# Patient Record
Sex: FEMALE | Race: BLACK OR AFRICAN AMERICAN | NOT HISPANIC OR LATINO | Employment: FULL TIME | URBAN - METROPOLITAN AREA
[De-identification: names, ages, dates, MRNs, and addresses within clinical notes are randomized per-mention and may not be internally consistent; named-entity substitution may affect disease eponyms.]

---

## 2017-09-27 DIAGNOSIS — E03.9 HYPOTHYROIDISM, UNSPECIFIED TYPE: ICD-10-CM

## 2017-09-27 NOTE — TELEPHONE ENCOUNTER
levothyroxine (LEVOTHROID) 50 MCG tablet     Last Written Prescription Date: 9/9/16  Last Quantity: 90, # refills: 3  Last Office Visit with G, [facility] or [facility] prescribing provider: 8/22/16        TSH   Date Value Ref Range Status   08/22/2016 2.78 0.40 - 4.00 mU/L Final

## 2017-09-28 RX ORDER — LEVOTHYROXINE SODIUM 50 UG/1
TABLET ORAL
Qty: 90 TABLET | Refills: 0 | Status: SHIPPED | OUTPATIENT
Start: 2017-09-28 | End: 2018-01-02

## 2017-09-28 NOTE — TELEPHONE ENCOUNTER
Medication is being filled for 1 time refill only due to:  Patient needs labs tsh. Patient needs to be seen because it has been more than one year since last visit.

## 2017-12-22 ENCOUNTER — OFFICE VISIT (OUTPATIENT)
Dept: FAMILY MEDICINE | Facility: CLINIC | Age: 47
End: 2017-12-22
Payer: COMMERCIAL

## 2017-12-22 VITALS
SYSTOLIC BLOOD PRESSURE: 100 MMHG | HEIGHT: 65 IN | HEART RATE: 68 BPM | DIASTOLIC BLOOD PRESSURE: 62 MMHG | TEMPERATURE: 96.6 F | WEIGHT: 161 LBS | BODY MASS INDEX: 26.82 KG/M2

## 2017-12-22 DIAGNOSIS — E55.9 VITAMIN D DEFICIENCY: ICD-10-CM

## 2017-12-22 DIAGNOSIS — D50.9 IRON DEFICIENCY ANEMIA, UNSPECIFIED IRON DEFICIENCY ANEMIA TYPE: ICD-10-CM

## 2017-12-22 DIAGNOSIS — Z00.00 ROUTINE GENERAL MEDICAL EXAMINATION AT A HEALTH CARE FACILITY: Primary | ICD-10-CM

## 2017-12-22 DIAGNOSIS — E03.9 HYPOTHYROIDISM, UNSPECIFIED TYPE: ICD-10-CM

## 2017-12-22 DIAGNOSIS — R22.31 LOCALIZED SWELLING, MASS, OR LUMP OF UPPER EXTREMITY, RIGHT: ICD-10-CM

## 2017-12-22 LAB
CHOLEST SERPL-MCNC: 244 MG/DL
ERYTHROCYTE [DISTWIDTH] IN BLOOD BY AUTOMATED COUNT: 12.4 % (ref 10–15)
GLUCOSE SERPL-MCNC: 94 MG/DL (ref 70–99)
HCT VFR BLD AUTO: 42.9 % (ref 35–47)
HDLC SERPL-MCNC: 67 MG/DL
HGB BLD-MCNC: 14.5 G/DL (ref 11.7–15.7)
LDLC SERPL CALC-MCNC: 159 MG/DL
MCH RBC QN AUTO: 30 PG (ref 26.5–33)
MCHC RBC AUTO-ENTMCNC: 33.8 G/DL (ref 31.5–36.5)
MCV RBC AUTO: 89 FL (ref 78–100)
NONHDLC SERPL-MCNC: 177 MG/DL
PLATELET # BLD AUTO: 199 10E9/L (ref 150–450)
RBC # BLD AUTO: 4.84 10E12/L (ref 3.8–5.2)
TRIGL SERPL-MCNC: 88 MG/DL
TSH SERPL DL<=0.005 MIU/L-ACNC: 1.84 MU/L (ref 0.4–4)
WBC # BLD AUTO: 3.1 10E9/L (ref 4–11)

## 2017-12-22 PROCEDURE — 82947 ASSAY GLUCOSE BLOOD QUANT: CPT | Performed by: FAMILY MEDICINE

## 2017-12-22 PROCEDURE — 82306 VITAMIN D 25 HYDROXY: CPT | Performed by: FAMILY MEDICINE

## 2017-12-22 PROCEDURE — 85027 COMPLETE CBC AUTOMATED: CPT | Performed by: FAMILY MEDICINE

## 2017-12-22 PROCEDURE — 36415 COLL VENOUS BLD VENIPUNCTURE: CPT | Performed by: FAMILY MEDICINE

## 2017-12-22 PROCEDURE — 80061 LIPID PANEL: CPT | Performed by: FAMILY MEDICINE

## 2017-12-22 PROCEDURE — 99396 PREV VISIT EST AGE 40-64: CPT | Performed by: FAMILY MEDICINE

## 2017-12-22 PROCEDURE — 84443 ASSAY THYROID STIM HORMONE: CPT | Performed by: FAMILY MEDICINE

## 2017-12-22 NOTE — PROGRESS NOTES
SUBJECTIVE:   CC: Ruth Castellon is an 47 year old woman who presents for preventive health visit.     Physical   Annual:     Getting at least 3 servings of Calcium per day::  Yes    Bi-annual eye exam::  Yes    Dental care twice a year::  Yes    Sleep apnea or symptoms of sleep apnea::  None    Diet::  Regular (no restrictions)    Frequency of exercise::  2-3 days/week    Duration of exercise::  15-30 minutes    Taking medications regularly::  Yes    Medication side effects::  None    Additional concerns today::  No          she has lost referral for general surgery that she was given for the swelling on her right arm. Requesting referral again.     Today's PHQ-2 Score: PHQ-2 ( 1999 Pfizer) 12/22/2017   Q1: Little interest or pleasure in doing things 0   Q2: Feeling down, depressed or hopeless 0   PHQ-2 Score 0   Q1: Little interest or pleasure in doing things Not at all   Q2: Feeling down, depressed or hopeless Not at all   PHQ-2 Score 0       Abuse: Current or Past(Physical, Sexual or Emotional)- no  Do you feel safe in your environment - Yes    Social History   Substance Use Topics     Smoking status: Never Smoker     Smokeless tobacco: Never Used      Comment: Nonsmoking household     Alcohol use No     Alcohol Use 12/22/2017   If you drink alcohol, do you typically have greater than 3 drinks per day OR greater than 7 drinks per week?   Not applicable   No flowsheet data found.      Reviewed orders with patient.  Reviewed health maintenance and updated orders accordingly - Yes  Labs reviewed in EPIC  BP Readings from Last 3 Encounters:   12/22/17 100/62   08/22/16 90/57   06/17/15 (!) 89/53    Wt Readings from Last 3 Encounters:   12/22/17 161 lb (73 kg)   08/22/16 156 lb (70.8 kg)   06/17/15 155 lb (70.3 kg)                  Patient Active Problem List   Diagnosis     Dysmenorrhea     Vitamin D deficiency     Enlarged thyroid gland     Gastritis, chronic     Pelvic pain in female     [facility]     Perineal  insufficiency     CARDIOVASCULAR SCREENING; LDL GOAL LESS THAN 160     History of [id number]     Health Care Home     Hypothyroidism, unspecified type     Iron deficiency anemia, unspecified iron deficiency anemia type     Past Surgical History:   Procedure Laterality Date      SECTION STANDBY      [facility] REMOVAL OF OVARY/TUBE(S)      left sided. postpartum ? cyst ? ovarian vein thrombosis       Social History   Substance Use Topics     Smoking status: Never Smoker     Smokeless tobacco: Never Used      Comment: Nonsmoking household     Alcohol use No     Family History   Problem Relation Age of Onset     Glaucoma No family hx of      Macular Degeneration No family hx of          Current Outpatient Prescriptions   Medication Sig Dispense Refill     levothyroxine (SYNTHROID/LEVOTHROID) 50 MCG tablet TAKE ONE TABLET BY MOUTH EVERY DAY 90 tablet 0     No Known Allergies  Recent Labs   Lab Test  17   0914  16   1857   02/20/15   1356   14   0844  13   1207   11   2117   LDL  159*   --    --   128   --    --   109   --    --    HDL  67   --    --   74   --    --   53   --    --    TRIG  88   --    --   64   --    --   44   --    --    ALT   --   38   --    --    --    --   39   --   16   CR   --   0.74   --    --    --   0.77  0.65   --   0.80   GFRESTIMATED   --   85   --    --    --   83  >90   --   80   GFRESTBLACK   --   >90   GFR Calc     --    --    --   >90  >90   --   >90   POTASSIUM   --   3.9   --    --    --   4.1  3.7   --    --    TSH  1.84  2.78   < >   --    < >  4.79   --    < >   --     < > = values in this interval not displayed.              Patient under age 50, mutual decision reflected in health maintenance.        Pertinent mammograms are reviewed under the imaging tab.  History of abnormal Pap smear: NO - age 30-65 PAP every 5 years with negative HPV co-testing recommended    Reviewed and updated as needed this visit  "by clinical staff         Reviewed and updated as needed this visit by Provider        Review of Systems  C: NEGATIVE for fever, chills, change in weight  I: NEGATIVE for worrisome rashes, moles or lesions  E: NEGATIVE for vision changes or irritation  ENT: NEGATIVE for ear, mouth and throat problems  R: NEGATIVE for significant cough or SOB  B: NEGATIVE for masses, tenderness or discharge  CV: NEGATIVE for chest pain, palpitations or peripheral edema  GI: NEGATIVE for nausea, abdominal pain, heartburn, or change in bowel habits  : NEGATIVE for unusual urinary or vaginal symptoms. Periods are regular.  M: NEGATIVE for significant arthralgias or myalgia  N: NEGATIVE for weakness, dizziness or paresthesias  P: NEGATIVE for changes in mood or affect     OBJECTIVE:   /62  Pulse 68  Temp 96.6  F (35.9  C) (Oral)  Ht 5' 5\" (1.651 m)  Wt 161 lb (73 kg)  BMI 26.79 kg/m2  Physical Exam  GENERAL: healthy, alert and no distress  EYES: Eyes grossly normal to inspection, PERRL and conjunctivae and sclerae normal  HENT: ear canals and TM's normal, nose and mouth without ulcers or lesions  NECK: no adenopathy, no asymmetry, masses, or scars and thyroid normal to palpation  RESP: lungs clear to auscultation - no rales, rhonchi or wheezes  BREAST: normal without masses, tenderness or nipple discharge and no palpable axillary masses or adenopathy  CV: regular rate and rhythm, normal S1 S2, no S3 or S4, no murmur, click or rub, no peripheral edema and peripheral pulses strong  ABDOMEN: soft, nontender, no hepatosplenomegaly, no masses and bowel sounds normal  MS: no gross musculoskeletal defects noted, no edema  SKIN: no suspicious lesions or rashes  NEURO: Normal strength and tone, mentation intact and speech normal  PSYCH: mentation appears normal, affect normal/bright    ASSESSMENT/PLAN:       ICD-10-CM    1. Routine general medical examination at a health care facility Z00.00 Lipid panel reflex to direct LDL Fasting " "    Glucose   2. Hypothyroidism, unspecified type E03.9 TSH WITH FREE T4 REFLEX   3. Vitamin D deficiency E55.9 Vitamin D Deficiency   4. Iron deficiency anemia, unspecified iron deficiency anemia type D50.9 CBC with platelets   5. Localized swelling, mass, or lump of upper extremity, right R22.31 GENERAL SURG ADULT REFERRAL     Ordered labs as above.     Per pt she was told by Endocrinology to f/u with PCP as long as her labs look good on medications.     COUNSELING:  Reviewed preventive health counseling, as reflected in patient instructions     reports that she has never smoked. She has never used smokeless tobacco.    Estimated body mass index is 25.96 kg/(m^2) as calculated from the following:    Height as of 8/22/16: 5' 5\" (1.651 m).    Weight as of 8/22/16: 156 lb (70.8 kg).       Counseling Resources:  ATP IV Guidelines  Pooled Cohorts Equation Calculator  Breast Cancer Risk Calculator  FRAX Risk Assessment  ICSI Preventive Guidelines  Dietary Guidelines for Americans, 2010  USDA's MyPlate  ASA Prophylaxis  Lung CA Screening    Darlene Tolliver MD  [facility]  Answers for HPI/ROS submitted by the patient on 12/22/2017   PHQ-2 Score: 0    "

## 2017-12-22 NOTE — LETTER
[facility]   [address] [place]  [address]  [phone]                               January 2, 2018    Ruth Castellon  [address] [facility] [address]        Dear Ruth,    Your recent labs are attached. Continue taking levothyroxine, refill has been sent to you pharmacy.     Take 2000 IU of vitamin D daily.     Results for orders placed or performed in visit on 12/22/17   TSH WITH FREE T4 REFLEX   Result Value Ref Range    TSH 1.84 0.40 - 4.00 mU/L   Vitamin D Deficiency   Result Value Ref Range    Vitamin D Deficiency screening 20 20 - 75 ug/L   Lipid panel reflex to direct LDL Fasting   Result Value Ref Range    Cholesterol 244 (H) <200 mg/dL    Triglycerides 88 <150 mg/dL    HDL Cholesterol 67 >49 mg/dL    LDL Cholesterol Calculated 159 (H) <100 mg/dL    Non HDL Cholesterol 177 (H) <130 mg/dL   Glucose   Result Value Ref Range    Glucose 94 70 - 99 mg/dL   CBC with platelets   Result Value Ref Range    WBC 3.1 (L) 4.0 - 11.0 10e9/L    RBC Count 4.84 3.8 - 5.2 10e12/L    Hemoglobin 14.5 11.7 - 15.7 g/dL    Hematocrit 42.9 35.0 - 47.0 %    MCV 89 78 - 100 fl    MCH 30.0 26.5 - 33.0 pg    MCHC 33.8 31.5 - 36.5 g/dL    RDW 12.4 10.0 - 15.0 %    Platelet Count 199 150 - 450 10e9/L   If you have any questions please call the clinic at [phone]    Sincerely,    Darlene Tolliver MD  bmd

## 2017-12-22 NOTE — NURSING NOTE
"Chief Complaint   Patient presents with     Physical       Initial /62  Pulse 68  Temp 96.6  F (35.9  C) (Oral)  Ht 5' 5\" (1.651 m)  Wt 161 lb (73 kg)  BMI 26.79 kg/m2 Estimated body mass index is 26.79 kg/(m^2) as calculated from the following:    Height as of this encounter: 5' 5\" (1.651 m).    Weight as of this encounter: 161 lb (73 kg).  Medication Reconciliation: complete  Carson Amezquita MA    "

## 2017-12-22 NOTE — MR AVS SNAPSHOT
After Visit Summary   12/22/2017    Ruth Castellon    MRN: [id number]           Patient Information     Date Of Birth          1970        Visit Information        Provider Department      12/22/2017 8:20 AM Darlene Tolliver MD [facility]        Today's Diagnoses     Routine general medical examination at a health care facility    -  1    Hypothyroidism, unspecified type        Vitamin D deficiency        Iron deficiency anemia, unspecified iron deficiency anemia type        Localized swelling, mass, or lump of upper extremity, right          Care Instructions      Preventive Health Recommendations  Female Ages 40 to 49    Yearly exam:     See your health care provider every year in order to  1. Review health changes.   2. Discuss preventive care.    3. Review your medicines if your doctor prescribed any.      Get a Pap test every three years (unless you have an abnormal result and your provider advises testing more often).      If you get Pap tests with HPV test, you only need to test every 5 years, unless you have an abnormal result. You do not need a Pap test if your uterus was removed (hysterectomy) and you have not had cancer.      You should be tested each year for STDs (sexually transmitted diseases), if you're at risk.       Ask your doctor if you should have a mammogram.      Have a colonoscopy (test for colon cancer) if someone in your family has had colon cancer or polyps before age 50.       Have a cholesterol test every 5 years.       Have a diabetes test (fasting glucose) after age 45. If you are at risk for diabetes, you should have this test every 3 years.    Shots: Get a flu shot each year. Get a tetanus shot every 10 years.     Nutrition:     Eat at least 5 servings of fruits and vegetables each day.    Eat whole-grain bread, whole-wheat pasta and brown rice instead of white grains and rice.    Talk to your provider about Calcium and Vitamin D.      Lifestyle    Exercise at least 150 minutes a week (an average of 30 minutes a day, 5 days a week). This will help you control your weight and prevent disease.    Limit alcohol to one drink per day.    No smoking.     Wear sunscreen to prevent skin cancer.    See your dentist every six months for an exam and cleaning.          Follow-ups after your visit        Additional Services     GENERAL SURG ADULT REFERRAL       Your provider has referred you to: [facility]: [facility] [phone]   [email or web address]    Please be aware that coverage of these services is subject to the terms and limitations of your health insurance plan.  Call member services at your health plan with any benefit or coverage questions.      Please bring the following with you to your appointment:    (1) Any X-Rays, CTs or MRIs which have been performed.  Contact the facility where they were done to arrange for  prior to your scheduled appointment.   (2) List of current medications   (3) This referral request   (4) Any documents/labs given to you for this referral                  Who to contact     If you have questions or need follow up information about today's clinic visit or your schedule please contact [facility] directly at [phone].  Normal or non-critical lab and imaging results will be communicated to you by MyChart, letter or phone within 4 business days after the clinic has received the results. If you do not hear from us within 7 days, please contact the clinic through [employer] or phone. If you have a critical or abnormal lab result, we will notify you by phone as soon as possible.  Submit refill requests through [employer] or call your pharmacy and they will forward the refill request to us. Please allow 3 business days for your refill to be completed.          Additional Information About Your Visit        [employer] Information     [employer] lets you send  "messages to your doctor, view your test results, renew your prescriptions, schedule appointments and more. To sign up, go to [email or web address] . Click on \"Log in\" on the left side of the screen, which will take you to the Welcome page. Then click on \"Sign up Now\" on the right side of the page.     You will be asked to enter the access code listed below, as well as some personal information. Please follow the directions to create your username and password.     Your access code is: [email or web address]  Expires: 3/22/2018  9:12 AM     Your access code will  in 90 days. If you need help or a new code, please call your [facility] clinic or [phone].        Care EveryWhere ID     This is your Care EveryWhere ID. This could be used by other organizations to access your [facility] medical records  [id number]        Your Vitals Were     Pulse Temperature Height BMI (Body Mass Index)          68 96.6  F (35.9  C) (Oral) 5' 5\" (1.651 m) 26.79 kg/m2         Blood Pressure from Last 3 Encounters:   17 100/62   16 90/57   06/17/15 (!) 89/53    Weight from Last 3 Encounters:   17 161 lb (73 kg)   16 156 lb (70.8 kg)   06/17/15 155 lb (70.3 kg)              We Performed the Following     CBC with platelets     GENERAL SURG ADULT REFERRAL     Glucose     Lipid panel reflex to direct LDL Fasting     TSH WITH FREE T4 REFLEX     Vitamin D Deficiency        Primary Care Provider Office Phone # Fax #    Darlene Zachery Tolliver [phone]       [address] [facility] [address]        Equal Access to Services     [facility]: Kayley Ghosh, maura villeda, allison valerio. So [facility] [phone].    ATENCIÓN: Si habla español, tiene a sellers disposición servicios gratuitos de asistencia lingüística. Llame al [phone].    We comply with applicable federal civil rights laws and [place] laws. We do not " discriminate on the basis of race, color, national origin, age, disability, sex, sexual orientation, or gender identity.            Thank you!     Thank you for choosing [facility]  for your care. Our goal is always to provide you with excellent care. Hearing back from our patients is one way we can continue to improve our services. Please take a few minutes to complete the written survey that you may receive in the mail after your visit with us. Thank you!             Your Updated Medication List - Protect others around you: Learn how to safely use, store and throw away your medicines at www.disposemymeds.org.          This list is accurate as of: 12/22/17  9:12 AM.  Always use your most recent med list.                   Brand Name Dispense Instructions for use Diagnosis    levothyroxine 50 MCG tablet    SYNTHROID/LEVOTHROID    90 tablet    TAKE ONE TABLET BY MOUTH EVERY DAY    Hypothyroidism, unspecified type

## 2017-12-23 LAB — DEPRECATED CALCIDIOL+CALCIFEROL SERPL-MC: 20 UG/L (ref 20–75)

## 2018-01-02 DIAGNOSIS — E03.9 HYPOTHYROIDISM, UNSPECIFIED TYPE: Primary | ICD-10-CM

## 2018-01-02 DIAGNOSIS — E55.9 VITAMIN D DEFICIENCY: ICD-10-CM

## 2018-01-02 RX ORDER — CHOLECALCIFEROL (VITAMIN D3) 50 MCG
2000 TABLET ORAL DAILY
Qty: 100 TABLET | Refills: 3 | Status: SHIPPED | OUTPATIENT
Start: 2018-01-02 | End: 2018-12-24

## 2018-01-02 RX ORDER — LEVOTHYROXINE SODIUM 50 UG/1
50 TABLET ORAL DAILY
Qty: 90 TABLET | Refills: 3 | Status: SHIPPED | OUTPATIENT
Start: 2018-01-02 | End: 2019-01-31

## 2018-01-02 NOTE — PROGRESS NOTES
Dear Ruth Castellon,     Your recent labs are attached. Continue taking levothyroxine, refill has been sent to you pharmacy.     Take 2000 IU of vitamin D daily.     Darlene Tolliver MD.   Family Physician.  [facility].

## 2018-12-24 ENCOUNTER — OFFICE VISIT (OUTPATIENT)
Dept: FAMILY MEDICINE | Facility: CLINIC | Age: 48
End: 2018-12-24
Payer: COMMERCIAL

## 2018-12-24 VITALS
TEMPERATURE: 97.6 F | HEIGHT: 65 IN | WEIGHT: 155 LBS | BODY MASS INDEX: 25.83 KG/M2 | DIASTOLIC BLOOD PRESSURE: 70 MMHG | SYSTOLIC BLOOD PRESSURE: 111 MMHG | HEART RATE: 60 BPM

## 2018-12-24 DIAGNOSIS — D72.819 LEUKOPENIA, UNSPECIFIED TYPE: ICD-10-CM

## 2018-12-24 DIAGNOSIS — Z00.00 ROUTINE GENERAL MEDICAL EXAMINATION AT A HEALTH CARE FACILITY: Primary | ICD-10-CM

## 2018-12-24 DIAGNOSIS — E55.9 VITAMIN D DEFICIENCY: ICD-10-CM

## 2018-12-24 DIAGNOSIS — E03.9 HYPOTHYROIDISM, UNSPECIFIED TYPE: ICD-10-CM

## 2018-12-24 DIAGNOSIS — R22.31 MASS OF RIGHT UPPER EXTREMITY: ICD-10-CM

## 2018-12-24 DIAGNOSIS — D50.9 IRON DEFICIENCY ANEMIA, UNSPECIFIED IRON DEFICIENCY ANEMIA TYPE: ICD-10-CM

## 2018-12-24 LAB
ERYTHROCYTE [DISTWIDTH] IN BLOOD BY AUTOMATED COUNT: 12.2 % (ref 10–15)
FERRITIN SERPL-MCNC: 31 NG/ML (ref 8–252)
HCT VFR BLD AUTO: 39.9 % (ref 35–47)
HGB BLD-MCNC: 13.5 G/DL (ref 11.7–15.7)
IRON SATN MFR SERPL: 41 % (ref 15–46)
IRON SERPL-MCNC: 112 UG/DL (ref 35–180)
MCH RBC QN AUTO: 30.1 PG (ref 26.5–33)
MCHC RBC AUTO-ENTMCNC: 33.8 G/DL (ref 31.5–36.5)
MCV RBC AUTO: 89 FL (ref 78–100)
PLATELET # BLD AUTO: 186 10E9/L (ref 150–450)
RBC # BLD AUTO: 4.48 10E12/L (ref 3.8–5.2)
TIBC SERPL-MCNC: 274 UG/DL (ref 240–430)
TSH SERPL DL<=0.005 MIU/L-ACNC: 1.98 MU/L (ref 0.4–4)
WBC # BLD AUTO: 2.6 10E9/L (ref 4–11)

## 2018-12-24 PROCEDURE — 84443 ASSAY THYROID STIM HORMONE: CPT | Performed by: PHYSICIAN ASSISTANT

## 2018-12-24 PROCEDURE — 36415 COLL VENOUS BLD VENIPUNCTURE: CPT | Performed by: PHYSICIAN ASSISTANT

## 2018-12-24 PROCEDURE — 83540 ASSAY OF IRON: CPT | Performed by: PHYSICIAN ASSISTANT

## 2018-12-24 PROCEDURE — 82306 VITAMIN D 25 HYDROXY: CPT | Performed by: PHYSICIAN ASSISTANT

## 2018-12-24 PROCEDURE — 83550 IRON BINDING TEST: CPT | Performed by: PHYSICIAN ASSISTANT

## 2018-12-24 PROCEDURE — 99396 PREV VISIT EST AGE 40-64: CPT | Performed by: PHYSICIAN ASSISTANT

## 2018-12-24 PROCEDURE — 82728 ASSAY OF FERRITIN: CPT | Performed by: PHYSICIAN ASSISTANT

## 2018-12-24 PROCEDURE — 85027 COMPLETE CBC AUTOMATED: CPT | Performed by: PHYSICIAN ASSISTANT

## 2018-12-24 RX ORDER — LEVOTHYROXINE SODIUM 50 UG/1
50 TABLET ORAL DAILY
Qty: 90 TABLET | Refills: 3 | Status: CANCELLED | OUTPATIENT
Start: 2018-12-24

## 2018-12-24 RX ORDER — CHOLECALCIFEROL (VITAMIN D3) 50 MCG
2000 TABLET ORAL DAILY
Qty: 100 TABLET | Refills: 3 | Status: SHIPPED | OUTPATIENT
Start: 2018-12-24 | End: 2020-03-11

## 2018-12-24 ASSESSMENT — ENCOUNTER SYMPTOMS
NERVOUS/ANXIOUS: 0
COUGH: 0
HEADACHES: 0
HEMATOCHEZIA: 0
HEMATURIA: 0
BREAST MASS: 0
SORE THROAT: 0
CONSTIPATION: 0
DIZZINESS: 0
CHILLS: 0
WEAKNESS: 0
FREQUENCY: 0
MYALGIAS: 0
ABDOMINAL PAIN: 0
JOINT SWELLING: 0
NAUSEA: 0
HEARTBURN: 0
DIARRHEA: 0
FEVER: 0
SHORTNESS OF BREATH: 0
DYSURIA: 0
PALPITATIONS: 0
EYE PAIN: 0
ARTHRALGIAS: 1
PARESTHESIAS: 0

## 2018-12-24 ASSESSMENT — MIFFLIN-ST. JEOR: SCORE: 1343.96

## 2018-12-24 NOTE — PROGRESS NOTES
img   SUBJECTIVE:   CC: Ruth Castellon is an 48 year old woman who presents for preventive health visit.     Physical   Annual:     Getting at least 3 servings of Calcium per day:  Yes    Bi-annual eye exam:  Yes    Dental care twice a year:  Yes    Sleep apnea or symptoms of sleep apnea:  None    Diet:  Regular (no restrictions)    Frequency of exercise:  2-3 days/week    Duration of exercise:  Less than 15 minutes    Taking medications regularly:  Yes    Medication side effects:  None    Additional concerns today:  No    PHQ-2 Total Score: 0        Swelling above inside of right elbow - wants to discuss surgery  Has had the lump on right and now becoming painful.  Never had imaging.   Started after an IV years ago.      Wants to see OB about suturing up her vaginal after her last baby.        Today's PHQ-2 Score:   PHQ-2 ( 1999 Pfizer) 12/24/2018   Q1: Little interest or pleasure in doing things 0   Q2: Feeling down, depressed or hopeless 0   PHQ-2 Score 0   Q1: Little interest or pleasure in doing things Not at all   Q2: Feeling down, depressed or hopeless Not at all   PHQ-2 Score 0       Abuse: Current or Past(Physical, Sexual or Emotional)- No  Do you feel safe in your environment? Yes    Social History     Tobacco Use     Smoking status: Never Smoker     Smokeless tobacco: Never Used     Tobacco comment: Nonsmoking household   Substance Use Topics     Alcohol use: No     Alcohol Use 12/24/2018   If you drink alcohol do you typically have greater than 3 drinks per day OR greater than 7 drinks per week? No   No flowsheet data found.    Reviewed orders with patient.  Reviewed health maintenance and updated orders accordingly - Yes  Labs reviewed in [facility]    Mammogram Screening: Patient under age 50, mutual decision reflected in health maintenance.      Pertinent mammograms are reviewed under the imaging tab.  History of abnormal Pap smear: NO - age 30-65 PAP every 5 years with negative HPV co-testing recommended  PAP  "/ HPV 2/20/2015 1/31/2012 12/14/2009   PAP NIL NIL NIL     Reviewed and updated as needed this visit by clinical staff  Tobacco  Allergies  Meds  Med Hx  Surg Hx  Fam Hx  Soc Hx        Reviewed and updated as needed this visit by Provider  Allergies  Meds            Review of Systems   Constitutional: Negative for chills and fever.   HENT: Negative for congestion, ear pain, hearing loss and sore throat.    Eyes: Negative for pain and visual disturbance.   Respiratory: Negative for cough and shortness of breath.    Cardiovascular: Negative for chest pain, palpitations and peripheral edema.   Gastrointestinal: Negative for abdominal pain, constipation, diarrhea, heartburn, hematochezia and nausea.   Breasts:  Negative for tenderness, breast mass and discharge.   Genitourinary: Negative for dysuria, frequency, genital sores, hematuria, pelvic pain, urgency, vaginal bleeding and vaginal discharge.   Musculoskeletal: Positive for arthralgias (right arm ). Negative for joint swelling and myalgias.   Skin: Negative for rash.   Neurological: Negative for dizziness, weakness, headaches and paresthesias.   Psychiatric/Behavioral: Negative for mood changes. The patient is not nervous/anxious.           OBJECTIVE:   /70 (Cuff Size: Adult Regular)   Pulse 60   Temp 97.6  F (36.4  C) (Oral)   Ht 1.651 m (5' 5\")   Wt 70.3 kg (155 lb)   BMI 25.79 kg/m    Physical Exam   Constitutional: She is oriented to person, place, and time. She appears well-developed and well-nourished. No distress.   HENT:   Right Ear: Tympanic membrane and external ear normal.   Left Ear: Tympanic membrane and external ear normal.   Nose: Nose normal.   Mouth/Throat: Oropharynx is clear and moist. No oropharyngeal exudate.   Eyes: Conjunctivae are normal. Pupils are equal, round, and reactive to light. Right eye exhibits no discharge. Left eye exhibits no discharge.   Neck: Neck supple. No tracheal deviation present. No thyromegaly " present.   Cardiovascular: Normal rate, regular rhythm, S1 normal, S2 normal, normal heart sounds and normal pulses. Exam reveals no S3, no S4 and no friction rub.   No murmur heard.  Pulmonary/Chest: Effort normal and breath sounds normal. No respiratory distress. She has no wheezes. She has no rales. Right breast exhibits no mass, no nipple discharge and no tenderness. Left breast exhibits no mass, no nipple discharge and no tenderness.   Abdominal: Soft. Bowel sounds are normal. She exhibits no mass. There is no hepatosplenomegaly. There is no tenderness.   Musculoskeletal: Normal range of motion. She exhibits no edema.        Arms:  Lymphadenopathy:     She has no cervical adenopathy.   Neurological: She is alert and oriented to person, place, and time. She has normal strength and normal reflexes. She exhibits normal muscle tone.   Skin: Skin is warm and dry. No rash noted.   Psychiatric: She has a normal mood and affect. Judgment and thought content normal. Cognition and memory are normal.         Diagnostic Test Results:  none     ASSESSMENT/PLAN:   1. Routine general medical examination at a health care facility  Follow up when labs are back.  Sounds like pt wants her episiotomy  repaired, tried to explain it has healed by now but pt insistent to see gyn    - CBC with platelets  - Iron and iron binding capacity  - Ferritin  - OB/GYN REFERRAL    2. Vitamin D deficiency  Follow up as needed  - vitamin D3 (CHOLECALCIFEROL) 2000 units tablet; Take 1 tablet by mouth daily  Dispense: 100 tablet; Refill: 3  - Vitamin D Deficiency    3. Hypothyroidism, unspecified type  As above.  Will refill medications once labs are back.    - TSH WITH FREE T4 REFLEX    4. Iron deficiency anemia, unspecified iron deficiency anemia type  As above  - Iron and iron binding capacity  - Ferritin    5. Leukopenia, unspecified type  As above  - CBC with platelets    6. Mass of right upper extremity  Likely lipoma.  Follow up as needed  -  "US Extremity Non Vascular Right; Future    COUNSELING:      BP Readings from Last 1 Encounters:   12/24/18 111/70     Estimated body mass index is 25.79 kg/m  as calculated from the following:    Height as of this encounter: 1.651 m (5' 5\").    Weight as of this encounter: 70.3 kg (155 lb).      Weight management plan: Discussed healthy diet and exercise guidelines     reports that  has never smoked. she has never used smokeless tobacco.      Counseling Resources:  ATP IV Guidelines  Pooled Cohorts Equation Calculator  Breast Cancer Risk Calculator  FRAX Risk Assessment  ICSI Preventive Guidelines  Dietary Guidelines for Americans, 2010  USDA's MyPlate  ASA Prophylaxis  Lung CA Screening    Joleen Macdonald PA-C  [facility]  "

## 2018-12-26 LAB — DEPRECATED CALCIDIOL+CALCIFEROL SERPL-MC: 22 UG/L (ref 20–75)

## 2018-12-27 ENCOUNTER — ANCILLARY PROCEDURE (OUTPATIENT)
Dept: ULTRASOUND IMAGING | Facility: CLINIC | Age: 48
End: 2018-12-27
Attending: PHYSICIAN ASSISTANT
Payer: COMMERCIAL

## 2018-12-27 DIAGNOSIS — R22.31 MASS OF RIGHT UPPER EXTREMITY: ICD-10-CM

## 2018-12-27 PROCEDURE — 76882 US LMTD JT/FCL EVL NVASC XTR: CPT | Mod: RT

## 2018-12-28 ENCOUNTER — TELEPHONE (OUTPATIENT)
Dept: FAMILY MEDICINE | Facility: CLINIC | Age: 48
End: 2018-12-28

## 2018-12-28 DIAGNOSIS — D17.30 LIPOMA OF SKIN AND SUBCUTANEOUS TISSUE: Primary | ICD-10-CM

## 2018-12-28 DIAGNOSIS — D72.819 LEUKOPENIA, UNSPECIFIED TYPE: Primary | ICD-10-CM

## 2018-12-28 NOTE — TELEPHONE ENCOUNTER
Attempt # 1  Called patient at home number.  Was call answered? Yes, relayed message from PCP to patient - Patient verbalized understanding and agreement with plan and asked for a referral to surgeon also states it has grown again and really wants it off.      Chana Wright RN  [facility]

## 2018-12-28 NOTE — TELEPHONE ENCOUNTER
Attempt # 1  Called patient at home [phone]    Was call answered?  No answer, left message to labs WNL except WBC and will need to call clinic for a lab only visit in next few weeks.    Chana Wright RN  [facility]

## 2018-12-28 NOTE — TELEPHONE ENCOUNTER
Please let pt know that her labs are normal except her wbc continues to be low.  I would like her to return for more blood work in the next few weeks.      Joleen Macdonald PA-C

## 2018-12-28 NOTE — TELEPHONE ENCOUNTER
Attempt # 1  Called patient at home [phone] mail box full. Tried [phone] No answer, left message to call nurse line at [phone]    Was call answered?    Chana Wright RN  [facility]

## 2018-12-28 NOTE — TELEPHONE ENCOUNTER
Referral information:   Note    Your provider has referred you to: [facility]: [facility] [phone]   [email or web address]             Attempt # 1  Called patient at home [phone]  Was call answered? Yes, relayed above information - patient request a call back and leave number on voice mail as patient is driving at this time.   Nurse called back and left referral information on voice mail.     Chana Wright RN  [facility]

## 2018-12-28 NOTE — TELEPHONE ENCOUNTER
Patient returned call to RN line and left message for call back to her at [phone].  Khushboo Higginbotham RN  [facility]

## 2018-12-28 NOTE — TELEPHONE ENCOUNTER
Please let pt know her ultrasound does show that that mass is a lipoma.  If she wants it removed I will put in a referral to the surgeon.

## 2018-12-31 NOTE — TELEPHONE ENCOUNTER
Attempt # 1  Called patient at home [phone]     Was call answered?  yes, relayed message from PCP - Patient verbalized understanding and agreement with plan and did schedule lab only appointment.      Chana Wright RN  [facility]

## 2019-01-10 DIAGNOSIS — D72.819 LEUKOPENIA, UNSPECIFIED TYPE: ICD-10-CM

## 2019-01-10 LAB
RETICS # AUTO: 50.3 10E9/L (ref 25–95)
RETICS/RBC NFR AUTO: 1.1 % (ref 0.5–2)

## 2019-01-10 PROCEDURE — 85025 COMPLETE CBC W/AUTO DIFF WBC: CPT | Performed by: PHYSICIAN ASSISTANT

## 2019-01-10 PROCEDURE — 85045 AUTOMATED RETICULOCYTE COUNT: CPT | Performed by: PHYSICIAN ASSISTANT

## 2019-01-10 PROCEDURE — 36415 COLL VENOUS BLD VENIPUNCTURE: CPT | Performed by: PHYSICIAN ASSISTANT

## 2019-01-10 PROCEDURE — 85060 BLOOD SMEAR INTERPRETATION: CPT | Performed by: PHYSICIAN ASSISTANT

## 2019-01-11 LAB — COPATH REPORT: NORMAL

## 2019-01-14 ENCOUNTER — TELEPHONE (OUTPATIENT)
Dept: SURGERY | Facility: CLINIC | Age: 49
End: 2019-01-14

## 2019-01-14 ENCOUNTER — OFFICE VISIT (OUTPATIENT)
Dept: SURGERY | Facility: CLINIC | Age: 49
End: 2019-01-14
Payer: COMMERCIAL

## 2019-01-14 VITALS
DIASTOLIC BLOOD PRESSURE: 66 MMHG | HEART RATE: 77 BPM | SYSTOLIC BLOOD PRESSURE: 106 MMHG | BODY MASS INDEX: 26.46 KG/M2 | TEMPERATURE: 97.6 F | WEIGHT: 159 LBS

## 2019-01-14 DIAGNOSIS — D17.30 LIPOMA OF SKIN AND SUBCUTANEOUS TISSUE: ICD-10-CM

## 2019-01-14 PROBLEM — D70.8 OTHER NEUTROPENIA (H): Status: ACTIVE | Noted: 2019-01-14

## 2019-01-14 PROCEDURE — 99243 OFF/OP CNSLTJ NEW/EST LOW 30: CPT | Performed by: SURGERY

## 2019-01-14 NOTE — PROGRESS NOTES
Joleen Macdonald  [address] [facility] [address]    EDMUNDO CHAVIS    Patient seen in consultation for lipoma by Joleen Macdonald      I had the pleasure of seeing Edmundo Chavis in my office on 2019 for evaluation.    CC:   Chief Complaint   Patient presents with     Consult     Right Arm Lipoma     Today diagnosis (D17.30) Lipoma of skin and subcutaneous tissue      HPI: 48-year-old -American female who was referred to clinic for evaluation of right upper extremity mass.  Patient's was seen by her PCP who ordered an ultrasound of the area which showed a 5.6 ovoid lesion consistent with a lipoma.  Patient states that the mass has been present for several years and has slowly enlarged in size.  At this point in time because of the size patient is reporting increased pressure and discomfort over the mass that is worsened by movement or bending the arm.  Nothing seems to make it better.  She has never had this before.  No other masses or lesions of concern are reported today.    PAST MEDICAL HISTORY:  Past Medical History:   Diagnosis Date     Enlarged thyroid gland     normal function       PAST SURGICAL HISTORY:  Past Surgical History:   Procedure Laterality Date      SECTION STANDBY      [facility] REMOVAL OF OVARY/TUBE(S)      left sided. postpartum ? cyst ? ovarian vein thrombosis       MEDICATIONS:    Current Outpatient Medications:      levothyroxine (SYNTHROID/LEVOTHROID) 50 MCG tablet, Take 1 tablet (50 mcg) by mouth daily, Disp: 90 tablet, Rfl: 3     vitamin D3 (CHOLECALCIFEROL) 2000 units tablet, Take 1 tablet by mouth daily, Disp: 100 tablet, Rfl: 3    ALLERGIES:  No Known Allergies    SOCIAL HISTORY:  Social History     Socioeconomic History     Marital status:      Spouse name: Not on file     Number of children: Not on file     Years of education: Not on file     Highest education level: Not on file   Social Needs     Financial  resource strain: Not on file     Food insecurity - worry: Not on file     Food insecurity - inability: Not on file     Transportation needs - medical: Not on file     Transportation needs - non-medical: Not on file   Occupational History     Not on file   Tobacco Use     Smoking status: Never Smoker     Smokeless tobacco: Never Used     Tobacco comment: Nonsmoking household   Substance and Sexual Activity     Alcohol use: No     Drug use: No     Sexual activity: Yes     Partners: Male   Other Topics Concern     Parent/sibling w/ CABG, MI or angioplasty before 65F 55M? No   Social History Narrative     Not on file       FAMILY HISTORY:   Family History   Problem Relation Age of Onset     Glaucoma No family hx of      Macular Degeneration No family hx of      No significant family history of cardiovascular disease otherwise.    REVIEW OF SYSTEMS:  CONSTITUTIONAL:NEGATIVE for fever, chills, change in weight  INTEGUMENTARY/SKIN: NEGATIVE for worrisome rashes, moles or lesions  EYES: NEGATIVE for vision changes or irritation  ENT/MOUTH: NEGATIVE for ear, mouth and throat problems  RESP:NEGATIVE for significant cough or SOB  BREAST: NEGATIVE for masses, tenderness or discharge  CV: NEGATIVE for chest pain, palpitations or peripheral edema  GI: NEGATIVE for nausea, abdominal pain, heartburn, or change in bowel habits  negative  MUSCULOSKELETAL: NEGATIVE for significant arthralgias or myalgia  NEURO: NEGATIVE for weakness, dizziness or paresthesias  ENDOCRINE: NEGATIVE for temperature intolerance, skin/hair changes  HEME/ALLERGY/IMMUNE: NEGATIVE for bleeding problems  PSYCHIATRIC: NEGATIVE for changes in mood or affect        PHYSICAL EXAMINATION: Limited exam secondary to patient preference to keep [employer] garb on.   Vitals: /66   Pulse 77   Temp 97.6  F (36.4  C) (Oral)   Wt 72.1 kg (159 lb)   BMI 26.46 kg/m    BMI= Body mass index is 26.46 kg/m .  GENERAL/PSYCH: Patient is awake, A&Ox3, NAD, stable mood, good  judgement and insight.  HEAD: Atraumatic, Normocephalic  EYES: Anicteric. Pupils equal and reactive  RUE: Soft, rubbery, mobile palpable lesion at the AC fossa.  Non-tender.       LABS:    Orders Only on 01/10/2019   Component Date Value     % Retic 01/10/2019 1.1      Absolute Retic 01/10/2019 50.3      WBC 01/10/2019 3.0*     RBC Count 01/10/2019 4.67      Hemoglobin 01/10/2019 14.0      Hematocrit 01/10/2019 41.2      MCV 01/10/2019 88      MCH 01/10/2019 30.0      MCHC 01/10/2019 34.0      RDW 01/10/2019 12.2      Platelet Count 01/10/2019 186      Diff Method 01/10/2019 PENDING      Copath Report 01/10/2019                      Value:Patient Name: EDMUNDO CHAVIS  MR#: [id number]  Specimen #: [id number]  Collected: 1/10/2019  Received: 1/10/2019  Reported: 1/11/2019 18:10  Ordering Phy(s): ANDREW KHOURY    For improved result formatting, select 'View Enhanced Report Format' under   Linked Documents section.    TEST(S):  Peripheral Smear Morphology    FINAL DIAGNOSIS:  Peripheral blood morphology:  - Mild leukopenia with mild absolute neutropenia.    COMMENT:  Leukopenia with neutropenia in an adult may be seen in association with   drug reactions, certain viral  infections, vitamin deficiencies, primary hematologic disorder, autoimmune   processes, hypersplenism and copper  deficiency, among other causes.    Electronically signed out by:    Dereje Suggs M.D.    CLINICAL HISTORY:  48 year old female.  Leukopenia with neutropenia.    PERIPHERAL BLOOD DATA:  NOTE:  The automated total and differential blood cell counts provided   below and under Clinical Lab Results  correlate with microscopic examination of the                           peripheral blood smear.    PERIPHERAL BLOOD DATA (Date: 01/10/2019)  Patient Value (Reference Range >18 year old female)  2.98    WBC        (4.0-11.0 x 10*9/L)  4.67    RBC         (3.8-5.2 x 10*12/L)  14.0    HGB         (11.7-15.7 g/dL)  41.2    HCT         (35.0-47.0  %)  88.2    MCV        (78-100fL)  30.0    MCH        (26.5-33.0 pg)  34.0    MCHC      (31.5-36.5 g/dL)  12.2    RDW       (10.0-15.0 %)   186    PLT         (150-450 x 10*9/L)    PERIPHERAL BLOOD DIFFERENTIAL - Automated  (Reference ranges >18 year old)    Percent  42.0%  Neutrophils  44.0%  Lymphocytes  12.4%  Monocytes   1.3%  Eosinophils   0.3%  Basophils    Absolute  1.25   Neutrophils  (Ref normal 1.6 - 8.3 x 10*9/L)  1.31   Lymphocytes  (Ref normal 0.8 - 5.3 x 10*9/L)  0.37   Monocytes  (Ref normal 0 -1.3 x 10*9/L)  0.04   Eosinophils  (Ref normal 0 - 0.7 x 10*9/L)  0.01   Basophils  (Ref normal 0 - 0.2 x 10*9/L)    PERIPHERAL BLOOD MORPHOLOGY:    ERYTHROCYTES:  The red cells are normocytic, normochromic and n                          ormal in   number for the patient's age and  gender.  Anisopoikilocytosis is minimal.  No features of hemolysis or   increased polychromasia are identified.  No intracellular microorganisms are identified.    LEUKOCYTES:  The leukocytes are mildly decreased in number and are normal   in  morphology and differential  distribution, except there is mild absolute neutropenia.  No immature   precursors or evidence of neutrophilic  dysplasia is seen. No atypical lymphoid cells are seen. No intracellular   microorganisms are identified.    PLATELETS:  The platelets are normal in number and morphology.    CLINICAL LAB RESULTS:  Battery Order No. Lab Test Code Clinical Result Ref. Range Units Result   Date  Hemogram/Diff/PLT C34723 CP WBC Count L 3.0 4.0-11.0 10e9/L 1/10/2019   09:01       RBC Count 4.67 3.8-5.2 10e12/L 1/10/2019 09:01       Hemoglobin 14.0 11.7-15.7 g/dL 1/10/2019 09:01       Hematocrit 41.2 35.0-47.0 % 1/10/2019 09:01       MCV 88  fl 1/10/2019 09:01       MCH 30.0 26.5                          -33.0 pg 1/10/2019 09:01       MCHC 34.0 31.5-36.5 g/dL 1/10/2019 09:01       RDW 12.2 10.0-15.0 % 1/10/2019 09:01       Platelet Count 186 150-450 10e9/L 1/10/2019  09:01    Retic  MG Retic % 1.1 0.5-2.0 % 1/10/2019 11:37       Retic abs 50.3 25-95 10e9/L 1/10/2019 11:37    Hemogram/PLT T10370 CP WBC Count L 2.6 4.0-11.0 10e9/L 12/24/2018 09:10       RBC Count 4.48 3.8-5.2 10e12/L 12/24/2018 09:10       Hemoglobin 13.5 11.7-15.7 g/dL 12/24/2018 09:10       Hematocrit 39.9 35.0-47.0 % 12/24/2018 09:10       MCV 89  fl 12/24/2018 09:10       MCH 30.1 26.5-33.0 pg 12/24/2018 09:10       MCHC 33.8 31.5-36.5 g/dL 12/24/2018 09:10       RDW 12.2 10.0-15.0 % 12/24/2018 09:10       Platelet Count 186 150-450 10e9/L 12/24/2018 09:10    Iron Binding Panel  MG Iron 112  ug/dL 12/24/2018 11:56       Iron Binding Cap 274 240-430 ug/dL 12/24/2018 11:56       Iron Sat Index 41 15-46 % 12/24/2018 11:56    Ferritin   Ferritin 31 8-252 ng/mL 12/24/2018 11:56    CPT Codes:  A: 40104-UFMA    T                          [facility] LOCATION:  [address] [address] [phone]    COLLECTION SITE:  Client:  [facility]  Location:  [facility] (B)     Office Visit on 12/24/2018   Component Date Value     TSH 12/24/2018 1.98      Vitamin D Deficiency scr* 12/24/2018 22      WBC 12/24/2018 2.6*     RBC Count 12/24/2018 4.48      Hemoglobin 12/24/2018 13.5      Hematocrit 12/24/2018 39.9      MCV 12/24/2018 89      MCH 12/24/2018 30.1      MCHC 12/24/2018 33.8      RDW 12/24/2018 12.2      Platelet Count 12/24/2018 186      Iron 12/24/2018 112      Iron Binding Cap 12/24/2018 274      Iron Saturation Index 12/24/2018 41      Ferritin 12/24/2018 31        STUDIES:   Upper extremity Ultrasound: A 5.4 cm ovoid subcutaneous lesion appears to correspond to the patient's palpable abnormality and has an appearance suggestive of a subcutaneous lipoma.    IMPRESSION and PLAN:  Lipoma of skin and subcutaneous tissue  RUE, 5 cm lipoma at the AC fossa    Plan for excision    Risks and  benefits discussed in detail.        All questions were answered.

## 2019-01-14 NOTE — LETTER
2019         RE: Edmundo Castellon  [address] [facility] [address]        Dear Colleague,    Thank you for referring your patient, Edmundo Castellon, to the [facility]. Please see a copy of my visit note below.    Joleen Macdonald  [address] [facility] [address]    EDMUNDO CASTELLON    Patient seen in consultation for lipoma by Joleen Macdonald      I had the pleasure of seeing Edmundo Castellon in my office on 2019 for evaluation.    CC:   Chief Complaint   Patient presents with     Consult     Right Arm Lipoma     Today diagnosis (D17.30) Lipoma of skin and subcutaneous tissue      HPI: 48-year-old -American female who was referred to clinic for evaluation of right upper extremity mass.  Patient's was seen by her PCP who ordered an ultrasound of the area which showed a 5.6 ovoid lesion consistent with a lipoma.  Patient states that the mass has been present for several years and has slowly enlarged in size.  At this point in time because of the size patient is reporting increased pressure and discomfort over the mass that is worsened by movement or bending the arm.  Nothing seems to make it better.  She has never had this before.  No other masses or lesions of concern are reported today.    PAST MEDICAL HISTORY:  Past Medical History:   Diagnosis Date     Enlarged thyroid gland     normal function       PAST SURGICAL HISTORY:  Past Surgical History:   Procedure Laterality Date      SECTION STANDBY      [facility] REMOVAL OF OVARY/TUBE(S)      left sided. postpartum ? cyst ? ovarian vein thrombosis       MEDICATIONS:    Current Outpatient Medications:      levothyroxine (SYNTHROID/LEVOTHROID) 50 MCG tablet, Take 1 tablet (50 mcg) by mouth daily, Disp: 90 tablet, Rfl: 3     vitamin D3 (CHOLECALCIFEROL) 2000 units tablet, Take 1 tablet by mouth daily, Disp: 100 tablet, Rfl: 3    ALLERGIES:  No Known Allergies    SOCIAL  HISTORY:  Social History     Socioeconomic History     Marital status:      Spouse name: Not on file     Number of children: Not on file     Years of education: Not on file     Highest education level: Not on file   Social Needs     Financial resource strain: Not on file     Food insecurity - worry: Not on file     Food insecurity - inability: Not on file     Transportation needs - medical: Not on file     Transportation needs - non-medical: Not on file   Occupational History     Not on file   Tobacco Use     Smoking status: Never Smoker     Smokeless tobacco: Never Used     Tobacco comment: Nonsmoking household   Substance and Sexual Activity     Alcohol use: No     Drug use: No     Sexual activity: Yes     Partners: Male   Other Topics Concern     Parent/sibling w/ CABG, MI or angioplasty before 65F 55M? No   Social History Narrative     Not on file       FAMILY HISTORY:   Family History   Problem Relation Age of Onset     Glaucoma No family hx of      Macular Degeneration No family hx of      No significant family history of cardiovascular disease otherwise.    REVIEW OF SYSTEMS:  CONSTITUTIONAL:NEGATIVE for fever, chills, change in weight  INTEGUMENTARY/SKIN: NEGATIVE for worrisome rashes, moles or lesions  EYES: NEGATIVE for vision changes or irritation  ENT/MOUTH: NEGATIVE for ear, mouth and throat problems  RESP:NEGATIVE for significant cough or SOB  BREAST: NEGATIVE for masses, tenderness or discharge  CV: NEGATIVE for chest pain, palpitations or peripheral edema  GI: NEGATIVE for nausea, abdominal pain, heartburn, or change in bowel habits  negative  MUSCULOSKELETAL: NEGATIVE for significant arthralgias or myalgia  NEURO: NEGATIVE for weakness, dizziness or paresthesias  ENDOCRINE: NEGATIVE for temperature intolerance, skin/hair changes  HEME/ALLERGY/IMMUNE: NEGATIVE for bleeding problems  PSYCHIATRIC: NEGATIVE for changes in mood or affect        PHYSICAL EXAMINATION: Limited exam secondary to  patient preference to keep Religion garb on.   Vitals: /66   Pulse 77   Temp 97.6  F (36.4  C) (Oral)   Wt 72.1 kg (159 lb)   BMI 26.46 kg/m     BMI= Body mass index is 26.46 kg/m .  GENERAL/PSYCH: Patient is awake, A&Ox3, NAD, stable mood, good judgement and insight.  HEAD: Atraumatic, Normocephalic  EYES: Anicteric. Pupils equal and reactive  RUE: Soft, rubbery, mobile palpable lesion at the AC fossa.  Non-tender.       LABS:    Orders Only on 01/10/2019   Component Date Value     % Retic 01/10/2019 1.1      Absolute Retic 01/10/2019 50.3      WBC 01/10/2019 3.0*     RBC Count 01/10/2019 4.67      Hemoglobin 01/10/2019 14.0      Hematocrit 01/10/2019 41.2      MCV 01/10/2019 88      MCH 01/10/2019 30.0      MCHC 01/10/2019 34.0      RDW 01/10/2019 12.2      Platelet Count 01/10/2019 186      Diff Method 01/10/2019 PENDING      Copath Report 01/10/2019                      Value:Patient Name: EDMUNDO CHAVIS  MR#: [id number]  Specimen #: [id number]  Collected: 1/10/2019  Received: 1/10/2019  Reported: 1/11/2019 18:10  Ordering Phy(s): ANDREW KHOURY    For improved result formatting, select 'View Enhanced Report Format' under   Linked Documents section.    TEST(S):  Peripheral Smear Morphology    FINAL DIAGNOSIS:  Peripheral blood morphology:  - Mild leukopenia with mild absolute neutropenia.    COMMENT:  Leukopenia with neutropenia in an adult may be seen in association with   drug reactions, certain viral  infections, vitamin deficiencies, primary hematologic disorder, autoimmune   processes, hypersplenism and copper  deficiency, among other causes.    Electronically signed out by:    Dereje Suggs M.D.    CLINICAL HISTORY:  48 year old female.  Leukopenia with neutropenia.    PERIPHERAL BLOOD DATA:  NOTE:  The automated total and differential blood cell counts provided   below and under Clinical Lab Results  correlate with microscopic examination of the                           peripheral blood  smear.    PERIPHERAL BLOOD DATA (Date: 01/10/2019)  Patient Value (Reference Range >18 year old female)  2.98    WBC        (4.0-11.0 x 10*9/L)  4.67    RBC         (3.8-5.2 x 10*12/L)  14.0    HGB         (11.7-15.7 g/dL)  41.2    HCT         (35.0-47.0 %)  88.2    MCV        (78-100fL)  30.0    MCH        (26.5-33.0 pg)  34.0    MCHC      (31.5-36.5 g/dL)  12.2    RDW       (10.0-15.0 %)   186    PLT         (150-450 x 10*9/L)    PERIPHERAL BLOOD DIFFERENTIAL - Automated  (Reference ranges >18 year old)    Percent  42.0%  Neutrophils  44.0%  Lymphocytes  12.4%  Monocytes   1.3%  Eosinophils   0.3%  Basophils    Absolute  1.25   Neutrophils  (Ref normal 1.6 - 8.3 x 10*9/L)  1.31   Lymphocytes  (Ref normal 0.8 - 5.3 x 10*9/L)  0.37   Monocytes  (Ref normal 0 -1.3 x 10*9/L)  0.04   Eosinophils  (Ref normal 0 - 0.7 x 10*9/L)  0.01   Basophils  (Ref normal 0 - 0.2 x 10*9/L)    PERIPHERAL BLOOD MORPHOLOGY:    ERYTHROCYTES:  The red cells are normocytic, normochromic and n                          ormal in   number for the patient's age and  gender.  Anisopoikilocytosis is minimal.  No features of hemolysis or   increased polychromasia are identified.  No intracellular microorganisms are identified.    LEUKOCYTES:  The leukocytes are mildly decreased in number and are normal   in  morphology and differential  distribution, except there is mild absolute neutropenia.  No immature   precursors or evidence of neutrophilic  dysplasia is seen. No atypical lymphoid cells are seen. No intracellular   microorganisms are identified.    PLATELETS:  The platelets are normal in number and morphology.    CLINICAL LAB RESULTS:  Battery Order No. Lab Test Code Clinical Result Ref. Range Units Result   Date  Hemogram/Diff/PLT [id number] CP WBC Count L 3.0 4.0-11.0 10e9/L 1/10/2019   09:01       RBC Count 4.67 3.8-5.2 10e12/L 1/10/2019 09:01       Hemoglobin 14.0 11.7-15.7 g/dL 1/10/2019 09:01       Hematocrit 41.2 35.0-47.0 % 1/10/2019  09:01       MCV 88  fl 1/10/2019 09:01       MCH 30.0 26.5                          -33.0 pg 1/10/2019 09:01       MCHC 34.0 31.5-36.5 g/dL 1/10/2019 09:01       RDW 12.2 10.0-15.0 % 1/10/2019 09:01       Platelet Count 186 150-450 10e9/L 1/10/2019 09:01    Retic  MG Retic % 1.1 0.5-2.0 % 1/10/2019 11:37       Retic abs 50.3 25-95 10e9/L 1/10/2019 11:37    Hemogram/PLT [id number] CP WBC Count L 2.6 4.0-11.0 10e9/L 12/24/2018 09:10       RBC Count 4.48 3.8-5.2 10e12/L 12/24/2018 09:10       Hemoglobin 13.5 11.7-15.7 g/dL 12/24/2018 09:10       Hematocrit 39.9 35.0-47.0 % 12/24/2018 09:10       MCV 89  fl 12/24/2018 09:10       MCH 30.1 26.5-33.0 pg 12/24/2018 09:10       MCHC 33.8 31.5-36.5 g/dL 12/24/2018 09:10       RDW 12.2 10.0-15.0 % 12/24/2018 09:10       Platelet Count 186 150-450 10e9/L 12/24/2018 09:10    Iron Binding Panel  MG Iron 112  ug/dL 12/24/2018 11:56       Iron Binding Cap 274 240-430 ug/dL 12/24/2018 11:56       Iron Sat Index 41 15-46 % 12/24/2018 11:56    Ferritin   Ferritin 31 8-252 ng/mL 12/24/2018 11:56    CPT Codes:  A: 75461-SIUQ    T                          [facility] LOCATION:  [address] [address] [phone]    COLLECTION SITE:  Client:  [facility]  Location:  [facility] (B)     Office Visit on 12/24/2018   Component Date Value     TSH 12/24/2018 1.98      Vitamin D Deficiency scr* 12/24/2018 22      WBC 12/24/2018 2.6*     RBC Count 12/24/2018 4.48      Hemoglobin 12/24/2018 13.5      Hematocrit 12/24/2018 39.9      MCV 12/24/2018 89      MCH 12/24/2018 30.1      MCHC 12/24/2018 33.8      RDW 12/24/2018 12.2      Platelet Count 12/24/2018 186      Iron 12/24/2018 112      Iron Binding Cap 12/24/2018 274      Iron Saturation Index 12/24/2018 41      Ferritin 12/24/2018 31        STUDIES:   Upper extremity Ultrasound: A 5.4 cm ovoid subcutaneous lesion  appears to correspond to the patient's palpable abnormality and has an appearance suggestive of a subcutaneous lipoma.    IMPRESSION and PLAN:  Lipoma of skin and subcutaneous tissue  RUE, 5 cm lipoma at the AC fossa    Plan for excision    Risks and benefits discussed in detail.        All questions were answered.      Again, thank you for allowing me to participate in the care of your patient.        Sincerely,        Odell Guerra, DO

## 2019-01-16 LAB
BASOPHILS # BLD AUTO: 0 10E9/L (ref 0–0.2)
BASOPHILS NFR BLD AUTO: 0 %
DIFFERENTIAL METHOD BLD: ABNORMAL
EOSINOPHIL # BLD AUTO: 0 10E9/L (ref 0–0.7)
EOSINOPHIL NFR BLD AUTO: 1 %
ERYTHROCYTE [DISTWIDTH] IN BLOOD BY AUTOMATED COUNT: 12.2 % (ref 10–15)
HCT VFR BLD AUTO: 41.2 % (ref 35–47)
HGB BLD-MCNC: 14 G/DL (ref 11.7–15.7)
LYMPHOCYTES # BLD AUTO: 1.3 10E9/L (ref 0.8–5.3)
LYMPHOCYTES NFR BLD AUTO: 44 %
MCH RBC QN AUTO: 30 PG (ref 26.5–33)
MCHC RBC AUTO-ENTMCNC: 34 G/DL (ref 31.5–36.5)
MCV RBC AUTO: 88 FL (ref 78–100)
MONOCYTES # BLD AUTO: 0.4 10E9/L (ref 0–1.3)
MONOCYTES NFR BLD AUTO: 13 %
NEUTROPHILS # BLD AUTO: 1.3 10E9/L (ref 1.6–8.3)
NEUTROPHILS NFR BLD AUTO: 42 %
PLATELET # BLD AUTO: 186 10E9/L (ref 150–450)
RBC # BLD AUTO: 4.67 10E12/L (ref 3.8–5.2)
WBC # BLD AUTO: 3 10E9/L (ref 4–11)

## 2019-01-30 DIAGNOSIS — E03.9 HYPOTHYROIDISM, UNSPECIFIED TYPE: ICD-10-CM

## 2019-01-30 NOTE — TELEPHONE ENCOUNTER
"Requested Prescriptions   Pending Prescriptions Disp Refills     levothyroxine (SYNTHROID/LEVOTHROID) 50 MCG tablet 90 tablet 3    Last Written Prescription Date:  1/2/18  Last Fill Quantity: 90,  # refills: 3   Last office visit: 12/24/2018 with prescribing provider:     Future Office Visit:     Sig: Take 1 tablet (50 mcg) by mouth daily    Thyroid Protocol Passed - 1/30/2019  2:55 PM       Passed - Patient is 12 years or older       Passed - Recent (12 mo) or future (30 days) visit within the authorizing provider's specialty    Patient had office visit in the last 12 months or has a visit in the next 30 days with authorizing provider or within the authorizing provider's specialty.  See \"Patient Info\" tab in inbasket, or \"Choose Columns\" in Meds & Orders section of the refill encounter.             Passed - Medication is active on med list       Passed - Normal TSH on file in past 12 months    Recent Labs   Lab Test 12/24/18  0818   TSH 1.98             Passed - No active pregnancy on record    If patient is pregnant or has had a positive pregnancy test, please check TSH.         Passed - No positive pregnancy test in past 12 months    If patient is pregnant or has had a positive pregnancy test, please check TSH.            "

## 2019-01-31 RX ORDER — LEVOTHYROXINE SODIUM 50 UG/1
50 TABLET ORAL DAILY
Qty: 90 TABLET | Refills: 3 | Status: SHIPPED | OUTPATIENT
Start: 2019-01-31 | End: 2020-03-16

## 2019-02-07 ENCOUNTER — TELEPHONE (OUTPATIENT)
Facility: CLINIC | Age: 49
End: 2019-02-07

## 2019-02-07 ENCOUNTER — HOSPITAL ENCOUNTER (OUTPATIENT)
Facility: AMBULATORY SURGERY CENTER | Age: 49
End: 2019-02-07
Attending: SURGERY
Payer: COMMERCIAL

## 2019-02-07 NOTE — TELEPHONE ENCOUNTER
Type of surgery: excision of lipoma RUE  CPT 91722  Lipoma of skin and subcutaneous tissue [D17.30]   Location of surgery: [facility]  Date and time of surgery: 02/15/2019 / catia  Surgeon: CHRISTIAN Conley   Pre-Op Appt Date: 02/11/2019  Post-Op Appt Date: 02/25/2019   Packet sent out: Yes  Pre-cert/Authorization completed:  No pre cert needed  Date: 02/07/2019

## 2019-02-07 NOTE — TELEPHONE ENCOUNTER
Reason for Call:  Other call back    Detailed comments: Patient is scheduled for surgery on 02/15/2019 she has questions regarding anesthesia she is wanting only local and not mac. Please call and advise Thank you .     Phone Number Patient can be reached at: Home number on file [phone] (home)    Best Time: any    Can we leave a detailed message on this number? YES    Call taken on 2/7/2019 at 11:27 AM by Safia Pierce

## 2019-02-11 ENCOUNTER — OFFICE VISIT (OUTPATIENT)
Dept: FAMILY MEDICINE | Facility: CLINIC | Age: 49
End: 2019-02-11
Payer: COMMERCIAL

## 2019-02-11 VITALS
DIASTOLIC BLOOD PRESSURE: 56 MMHG | BODY MASS INDEX: 26.49 KG/M2 | HEIGHT: 65 IN | HEART RATE: 60 BPM | OXYGEN SATURATION: 98 % | SYSTOLIC BLOOD PRESSURE: 106 MMHG | TEMPERATURE: 97.5 F | WEIGHT: 159 LBS

## 2019-02-11 DIAGNOSIS — Z01.818 PREOP GENERAL PHYSICAL EXAM: Primary | ICD-10-CM

## 2019-02-11 DIAGNOSIS — D17.30 LIPOMA OF SKIN AND SUBCUTANEOUS TISSUE: ICD-10-CM

## 2019-02-11 PROCEDURE — 99214 OFFICE O/P EST MOD 30 MIN: CPT | Performed by: PHYSICIAN ASSISTANT

## 2019-02-11 ASSESSMENT — MIFFLIN-ST. JEOR: SCORE: 1362.1

## 2019-02-11 NOTE — PATIENT INSTRUCTIONS
Surgery Feb 15   Before Your Surgery      Call your surgeon if there is any change in your health. This includes signs of a cold or flu (such as a sore throat, runny nose, cough, rash or fever).    Do not smoke, drink alcohol or take over the counter medicine (unless your surgeon or primary care doctor tells you to) for the 24 hours before and after surgery.    If you take prescribed drugs: Follow your doctor s orders about which medicines to take and which to stop until after surgery.    Eating and drinking prior to surgery: follow the instructions from your surgeon    Take a shower or bath the night before surgery. Use the soap your surgeon gave you to gently clean your skin. If you do not have soap from your surgeon, use your regular soap. Do not shave or scrub the surgery site.  Wear clean pajamas and have clean sheets on your bed.

## 2019-02-11 NOTE — PROGRESS NOTES
[address] [id number] [phone]  Dept: [phone]    PRE-OP EVALUATION:  Today's date: 2019    Ruth Castellon (: 1970) presents for pre-operative evaluation assessment as requested by   She requires evaluation and anesthesia risk assessment prior to undergoing surgery/procedure for treatment of Excision  Of lipoma R upper extremity      Fax number for surgical facility: [facility]   Primary Physician: Joleen Macdonald  Type of Anesthesia Anticipated: to be determined    Patient has a Health Care Directive or Living Will:  NO    Preop Questions 2019   1.  Do you have a history of Heart attack, stroke, stent, coronary bypass surgery, or other heart surgery? No   2.  Do you ever have any pain or discomfort in your chest? No   3.  Do you have a history of  Heart Failure? No   4.   Are you troubled by shortness of breath when:  walking on a level surface, or up a slight hill, or at night? No   5.  Do you currently have a cold, bronchitis or other respiratory infection? No   6.  Do you have a cough, shortness of breath, or wheezing? No   7.  Do you sometimes get pains in the calves of your legs when you walk? No   8. Do you or anyone in your family have previous history of blood clots? No   9.  Do you or does anyone in your family have a serious bleeding problem such as prolonged bleeding following surgeries or cuts? No   10. Have you ever had problems with anemia or been told to take iron pills? YES - when pregnant    11. Have you had any abnormal blood loss such as black, tarry or bloody stools, or abnormal vaginal bleeding? No   12. Have you ever had a blood transfusion? No   13. Have you or any of your relatives ever had problems with anesthesia? No   14. Do you have sleep apnea, excessive snoring or daytime drowsiness? No   15. Do you have any prosthetic heart valves? No   16. Do you have  prosthetic joints? No   17. Is there any chance that you may be pregnant? No         HPI:     HPI related to upcoming procedure: removal of lipoma       See problem list for active medical problems.  Problems all longstanding and stable, except as noted/documented.  See ROS for pertinent symptoms related to these conditions.                                                                                                                                                          .    MEDICAL HISTORY:     Patient Active Problem List    Diagnosis Date Noted     Health Care Home 10/26/2011     Priority: High     X  DX V65.8 REPLACED WITH 66470 HEALTH CARE HOME (2013)       Lipoma of skin and subcutaneous tissue 2019     Priority: Medium     Other neutropenia (H) 2019     Priority: Medium     Blood smear unremarkable        Hypothyroidism, unspecified type 2016     Priority: Medium     Iron deficiency anemia, unspecified iron deficiency anemia type 2016     Priority: Medium     History of I-rlyzhcj-DNXA 2011     Priority: Medium     CARDIOVASCULAR SCREENING; LDL GOAL LESS THAN 160 10/31/2010     Priority: Medium     Pelvic pain in female 2010     Priority: Medium     Rlq midcycle       Mittelschmerz 2010     Priority: Medium     Perineal insufficiency 2010     Priority: Medium     Dysmenorrhea 2009     Priority: Medium     Vitamin D deficiency 2009     Priority: Medium     Enlarged thyroid gland 2009     Priority: Medium     09 Ultrasound shows heterogeneous enlargement without nodules.  Thyroid tests normal.       Gastritis, chronic 2009     Priority: Medium     EGD  showed moderate gastritis H. Pylori negative.  Colonscopy was normal .        Past Medical History:   Diagnosis Date     Enlarged thyroid gland     normal function     Past Surgical History:   Procedure Laterality Date      SECTION STANDBY      [facility]  "    HC REMOVAL OF OVARY/TUBE(S)  1997    left sided. postpartum ? cyst ? ovarian vein thrombosis     Current Outpatient Medications   Medication Sig Dispense Refill     levothyroxine (SYNTHROID/LEVOTHROID) 50 MCG tablet Take 1 tablet (50 mcg) by mouth daily 90 tablet 3     vitamin D3 (CHOLECALCIFEROL) 2000 units tablet Take 1 tablet by mouth daily 100 tablet 3     OTC products: None, except as noted above    No Known Allergies   Latex Allergy: NO    Social History     Tobacco Use     Smoking status: Never Smoker     Smokeless tobacco: Never Used     Tobacco comment: Nonsmoking household   Substance Use Topics     Alcohol use: No     History   Drug Use No       REVIEW OF SYSTEMS:   CONSTITUTIONAL: NEGATIVE for fever, chills, change in weight  INTEGUMENTARY/SKIN: NEGATIVE for worrisome rashes, moles or lesions  ENT/MOUTH: NEGATIVE for ear, mouth and throat problems  RESP: NEGATIVE for significant cough or SOB  CV: NEGATIVE for chest pain, palpitations or peripheral edema  GI: NEGATIVE for nausea, abdominal pain, heartburn, or change in bowel habits  : NEGATIVE for frequency, dysuria, or hematuria  MUSCULOSKELETAL: NEGATIVE for significant arthralgias or myalgia  NEURO: NEGATIVE for weakness, dizziness or paresthesias  ENDOCRINE: NEGATIVE for temperature intolerance, skin/hair changes  HEME: NEGATIVE for bleeding problems    EXAM:   /56   Pulse 60   Temp 97.5  F (36.4  C) (Oral)   Ht 1.651 m (5' 5\")   Wt 72.1 kg (159 lb)   SpO2 98%   BMI 26.46 kg/m      GENERAL APPEARANCE: healthy, alert and no distress     EYES: EOMI, PERRL     HENT: ear canals and TM's normal and nose and mouth without ulcers or lesions     NECK: no adenopathy and enlarged thyroid     RESP: lungs clear to auscultation - no rales, rhonchi or wheezes     CV: regular rates and rhythm, normal S1 S2, no S3 or S4 and no murmur, click or rub     ABDOMEN:  soft, nontender, no HSM or masses and bowel sounds normal     MS: extremities normal- no " gross deformities noted, no evidence of inflammation in joints, FROM in all extremities.     SKIN: lipoma right arm      PSYCH: mentation appears normal. and affect normal/bright     LYMPHATICS: No cervical adenopathy    DIAGNOSTICS:   No labs or EKG required for low risk surgery (cataract, skin procedure, breast biopsy, etc)  EKG: Not indicated due to non-vascular surgery and low risk of event (age <65 and without cardiac risk factors)    Recent Labs   Lab Test 01/10/19  0851 12/24/18  0818  08/22/16  1857  03/14/14  0844  12/24/11  0847  09/26/11  1720   HGB 14.0 13.5   < > 12.8   < > 12.7   < > 11.7   < > 12.1    186   < > 208   < > 166   < > 83*   < > 117*   INR  --   --   --   --   --   --   --  1.01  --  1.05   NA  --   --   --  139  --  136   < >  --   --  136   POTASSIUM  --   --   --  3.9  --  4.1   < >  --   --  3.8   CR  --   --   --  0.74  --  0.77   < >  --   --  0.63    < > = values in this interval not displayed.        IMPRESSION:   Reason for surgery/procedure: lipoma   Diagnosis/reason for consult: preop clearance     The proposed surgical procedure is considered LOW risk.    REVISED CARDIAC RISK INDEX  The patient has the following serious cardiovascular risks for perioperative complications such as (MI, PE, VFib and 3  AV Block):  No serious cardiac risks  INTERPRETATION: 0 risks: Class I (very low risk - 0.4% complication rate)    The patient has the following additional risks for perioperative complications:  No identified additional risks      ICD-10-CM    1. Preop general physical exam Z01.818    2. Lipoma of skin and subcutaneous tissue D17.30        RECOMMENDATIONS:     --Consult hospital rounder / IM to assist post-op medical management    --Patient is to take all scheduled medications on the day of surgery EXCEPT for modifications listed below.    APPROVAL GIVEN to proceed with proposed procedure, without further diagnostic evaluation       Signed Electronically by: Joleen  Anne Macdonald PA-C    Copy of this evaluation report is provided to requesting physician.    [facility] Preop Guidelines    Revised Cardiac Risk Index

## 2019-02-11 NOTE — TELEPHONE ENCOUNTER
Call to [facility], since location has changed, still no pre cert needed ref # [id number]. Faxing paperwork to [facility]    Thank you,   Maryjane Muller   Referral Department  [phone]

## 2019-02-25 ENCOUNTER — OFFICE VISIT (OUTPATIENT)
Dept: SURGERY | Facility: CLINIC | Age: 49
End: 2019-02-25
Payer: COMMERCIAL

## 2019-02-25 VITALS
HEART RATE: 72 BPM | WEIGHT: 159 LBS | TEMPERATURE: 98 F | DIASTOLIC BLOOD PRESSURE: 69 MMHG | SYSTOLIC BLOOD PRESSURE: 104 MMHG | BODY MASS INDEX: 26.46 KG/M2

## 2019-02-25 DIAGNOSIS — Z98.890 POSTOPERATIVE STATE: Primary | ICD-10-CM

## 2019-02-25 PROCEDURE — 99024 POSTOP FOLLOW-UP VISIT: CPT | Performed by: SURGERY

## 2019-02-25 ASSESSMENT — PAIN SCALES - GENERAL: PAINLEVEL: NO PAIN (0)

## 2019-02-25 NOTE — PROGRESS NOTES
HISTORY     Chief Complaint   Patient presents with     Surgical Followup     Lipoma removal     HISTORY OF PRESENT ILLNESS: Ruth Castellon is a 48 year old female with a past medical history as noted below, presents for follow up after excision of RUE Lipoma surgery. Doing well. Tolerating diet and having bowel movements. No nausea or vomiting    PAST MEDICAL/SURGICAL HISTORY  Past Medical History:   Diagnosis Date     Enlarged thyroid gland     normal function     Past Surgical History:   Procedure Laterality Date      SECTION STAND      [facility] REMOVAL OF OVARY/TUBE(S)      left sided. postpartum ? cyst ? ovarian vein thrombosis     SOFT TISSUE SURGERY      Excision of RUE Lipoma       MEDICATIONS AND ALLERGIES  No Known Allergies    Current Outpatient Medications:      levothyroxine (SYNTHROID/LEVOTHROID) 50 MCG tablet, Take 1 tablet (50 mcg) by mouth daily, Disp: 90 tablet, Rfl: 3     vitamin D3 (CHOLECALCIFEROL) 2000 units tablet, Take 1 tablet by mouth daily, Disp: 100 tablet, Rfl: 3    SOCIAL HISTORY  Social History     Socioeconomic History     Marital status:      Spouse name: Not on file     Number of children: Not on file     Years of education: Not on file     Highest education level: Not on file   Occupational History     Not on file   Social Needs     Financial resource strain: Not on file     Food insecurity:     Worry: Not on file     Inability: Not on file     Transportation needs:     Medical: Not on file     Non-medical: Not on file   Tobacco Use     Smoking status: Never Smoker     Smokeless tobacco: Never Used     Tobacco comment: Nonsmoking household   Substance and Sexual Activity     Alcohol use: No     Drug use: No     Sexual activity: Yes     Partners: Male   Lifestyle     Physical activity:     Days per week: Not on file     Minutes per session: Not on file     Stress: Not on file   Relationships     Social connections:     Talks on phone: Not on file      Gets together: Not on file     Attends Episcopal service: Not on file     Active member of club or organization: Not on file     Attends meetings of clubs or organizations: Not on file     Relationship status: Not on file     Intimate partner violence:     Fear of current or ex partner: Not on file     Emotionally abused: Not on file     Physically abused: Not on file     Forced sexual activity: Not on file   Other Topics Concern     Parent/sibling w/ CABG, MI or angioplasty before 65F 55M? No   Social History Narrative     Not on file        FAMILY HISTORY  Family History   Problem Relation Age of Onset     Glaucoma No family hx of      Macular Degeneration No family hx of        EXAMINATION     Vitals: /69   Pulse 72   Temp 98  F (36.7  C) (Oral)   Wt 72.1 kg (159 lb)   BMI 26.46 kg/m    BMI: Body mass index is 26.46 kg/m .    GENERAL/PSYCH: Patient is awake, A&Ox3, NAD, stable mood, good judgement and insight.  HEAD: Atraumatic, Normocephalic  EYES: Anicteric. Pupils equal and reactive  NECK: No masses/LNs. Trachea midline.  CHEST: Symmetrical, Respiratory effort WNL, no stridor.  HEART: Regular Rate and Rhythm.   ABDOMEN: Soft, non distended with no tenderness  INCISION: healing well, no drainage with minimal pain  LOWER EXTREMITIES: No gross deformity. Pulses palpable and equal bilaterally.  SKIN: No visible generalized rash.       ASSESSMENT & PLAN     S/P excision of RUE Lipoma.  Recovering well.  May resume full activities  Discussed pathology report.  Follow up as needed.  Follow up with PCP.  Author: Odell Guerra 2/25/2019 8:59 AM  Patient's Primary Care Provider: Joleen Macdonald

## 2019-02-25 NOTE — LETTER
2019         RE: Ruth Castellon  [address] [address]        Dear Colleague,    Thank you for referring your patient, Ruth Castellon, to the [facility]. Please see a copy of my visit note below.      HISTORY     Chief Complaint   Patient presents with     Surgical Followup     Lipoma removal     HISTORY OF PRESENT ILLNESS: Ruth Castellon is a 48 year old female with a past medical history as noted below, presents for follow up after excision of RUE Lipoma surgery. Doing well. Tolerating diet and having bowel movements. No nausea or vomiting    PAST MEDICAL/SURGICAL HISTORY  Past Medical History:   Diagnosis Date     Enlarged thyroid gland     normal function     Past Surgical History:   Procedure Laterality Date      SECTION STANDBY      [facility] REMOVAL OF OVARY/TUBE(S)      left sided. postpartum ? cyst ? ovarian vein thrombosis     SOFT TISSUE SURGERY      Excision of RUE Lipoma       MEDICATIONS AND ALLERGIES  No Known Allergies    Current Outpatient Medications:      levothyroxine (SYNTHROID/LEVOTHROID) 50 MCG tablet, Take 1 tablet (50 mcg) by mouth daily, Disp: 90 tablet, Rfl: 3     vitamin D3 (CHOLECALCIFEROL) 2000 units tablet, Take 1 tablet by mouth daily, Disp: 100 tablet, Rfl: 3    SOCIAL HISTORY  Social History     Socioeconomic History     Marital status:      Spouse name: Not on file     Number of children: Not on file     Years of education: Not on file     Highest education level: Not on file   Occupational History     Not on file   Social Needs     Financial resource strain: Not on file     Food insecurity:     Worry: Not on file     Inability: Not on file     Transportation needs:     Medical: Not on file     Non-medical: Not on file   Tobacco Use     Smoking status: Never Smoker     Smokeless tobacco: Never Used     Tobacco comment: Nonsmoking household   Substance and Sexual Activity     Alcohol use: No     Drug use: No      Sexual activity: Yes     Partners: Male   Lifestyle     Physical activity:     Days per week: Not on file     Minutes per session: Not on file     Stress: Not on file   Relationships     Social connections:     Talks on phone: Not on file     Gets together: Not on file     Attends Yazidism service: Not on file     Active member of club or organization: Not on file     Attends meetings of clubs or organizations: Not on file     Relationship status: Not on file     Intimate partner violence:     Fear of current or ex partner: Not on file     Emotionally abused: Not on file     Physically abused: Not on file     Forced sexual activity: Not on file   Other Topics Concern     Parent/sibling w/ CABG, MI or angioplasty before 65F 55M? No   Social History Narrative     Not on file        FAMILY HISTORY  Family History   Problem Relation Age of Onset     Glaucoma No family hx of      Macular Degeneration No family hx of        EXAMINATION     Vitals: /69   Pulse 72   Temp 98  F (36.7  C) (Oral)   Wt 72.1 kg (159 lb)   BMI 26.46 kg/m     BMI: Body mass index is 26.46 kg/m .    GENERAL/PSYCH: Patient is awake, A&Ox3, NAD, stable mood, good judgement and insight.  HEAD: Atraumatic, Normocephalic  EYES: Anicteric. Pupils equal and reactive  NECK: No masses/LNs. Trachea midline.  CHEST: Symmetrical, Respiratory effort WNL, no stridor.  HEART: Regular Rate and Rhythm.   ABDOMEN: Soft, non distended with no tenderness  INCISION: healing well, no drainage with minimal pain  LOWER EXTREMITIES: No gross deformity. Pulses palpable and equal bilaterally.  SKIN: No visible generalized rash.       ASSESSMENT & PLAN     S/P excision of RUE Lipoma.  Recovering well.  May resume full activities  Discussed pathology report.  Follow up as needed.  Follow up with PCP.  Author: Odell Guerra 2/25/2019 8:59 AM  Patient's Primary Care Provider: Joleen Macdonald        Again, thank you for allowing me to participate  in the care of your patient.        Sincerely,        Odell Guerra, DO

## 2019-11-18 ENCOUNTER — OFFICE VISIT (OUTPATIENT)
Dept: FAMILY MEDICINE | Facility: CLINIC | Age: 49
End: 2019-11-18
Payer: COMMERCIAL

## 2019-11-18 VITALS
HEART RATE: 59 BPM | TEMPERATURE: 97.6 F | BODY MASS INDEX: 25.63 KG/M2 | SYSTOLIC BLOOD PRESSURE: 97 MMHG | WEIGHT: 154 LBS | DIASTOLIC BLOOD PRESSURE: 61 MMHG | OXYGEN SATURATION: 98 %

## 2019-11-18 DIAGNOSIS — D50.9 IRON DEFICIENCY ANEMIA, UNSPECIFIED IRON DEFICIENCY ANEMIA TYPE: ICD-10-CM

## 2019-11-18 DIAGNOSIS — E03.9 HYPOTHYROIDISM, UNSPECIFIED TYPE: Primary | ICD-10-CM

## 2019-11-18 DIAGNOSIS — N94.6 DYSMENORRHEA: ICD-10-CM

## 2019-11-18 DIAGNOSIS — E55.9 VITAMIN D DEFICIENCY: ICD-10-CM

## 2019-11-18 LAB
ERYTHROCYTE [DISTWIDTH] IN BLOOD BY AUTOMATED COUNT: 12.3 % (ref 10–15)
FERRITIN SERPL-MCNC: 73 NG/ML (ref 8–252)
HCT VFR BLD AUTO: 39.9 % (ref 35–47)
HGB BLD-MCNC: 13 G/DL (ref 11.7–15.7)
IRON SATN MFR SERPL: 57 % (ref 15–46)
IRON SERPL-MCNC: 159 UG/DL (ref 35–180)
MCH RBC QN AUTO: 29.3 PG (ref 26.5–33)
MCHC RBC AUTO-ENTMCNC: 32.6 G/DL (ref 31.5–36.5)
MCV RBC AUTO: 90 FL (ref 78–100)
PLATELET # BLD AUTO: 180 10E9/L (ref 150–450)
RBC # BLD AUTO: 4.44 10E12/L (ref 3.8–5.2)
TIBC SERPL-MCNC: 280 UG/DL (ref 240–430)
TSH SERPL DL<=0.005 MIU/L-ACNC: 1.15 MU/L (ref 0.4–4)
WBC # BLD AUTO: 3 10E9/L (ref 4–11)

## 2019-11-18 PROCEDURE — 82728 ASSAY OF FERRITIN: CPT | Performed by: PHYSICIAN ASSISTANT

## 2019-11-18 PROCEDURE — 36415 COLL VENOUS BLD VENIPUNCTURE: CPT | Performed by: PHYSICIAN ASSISTANT

## 2019-11-18 PROCEDURE — 83550 IRON BINDING TEST: CPT | Performed by: PHYSICIAN ASSISTANT

## 2019-11-18 PROCEDURE — 84443 ASSAY THYROID STIM HORMONE: CPT | Performed by: PHYSICIAN ASSISTANT

## 2019-11-18 PROCEDURE — 83540 ASSAY OF IRON: CPT | Performed by: PHYSICIAN ASSISTANT

## 2019-11-18 PROCEDURE — 85027 COMPLETE CBC AUTOMATED: CPT | Performed by: PHYSICIAN ASSISTANT

## 2019-11-18 PROCEDURE — 99214 OFFICE O/P EST MOD 30 MIN: CPT | Performed by: PHYSICIAN ASSISTANT

## 2019-11-18 PROCEDURE — 82306 VITAMIN D 25 HYDROXY: CPT | Performed by: PHYSICIAN ASSISTANT

## 2019-11-18 NOTE — PATIENT INSTRUCTIONS
FMG: [facility] [phone]   [email or web address]     [facility]: [facility] [phone]   [email or web address]

## 2019-11-18 NOTE — PROGRESS NOTES
Subjective     Ruth Castellon is a 49 year old female who presents to clinic today for the following health issues:    HPI   Hypothyroidism Follow-up      Since last visit, patient describes the following symptoms: Weight stable, no hair loss, no skin changes, no constipation, no loose stools    Feels right side is better.  Last us was in .  No difficultly swallowing.        How many servings of fruits and vegetables do you eat daily?  2-3    On average, how many sweetened beverages do you drink each day (soda, juice, sweet tea, etc)?   0    How many days per week do you miss taking your medication? 0    Stopped vitamin d and iron a while ago  Having some joint pain.      Having some right side pain that comes and goes.  Starts about a week after period.  Wants to see OB.  No nausea or vomiting.  Periods are less.  Does sometimes get pain during the months she doesn't have her period.  No pain related to food or bm.  bm daily.          Patient Active Problem List   Diagnosis     Dysmenorrhea     Vitamin D deficiency     Enlarged thyroid gland     Gastritis, chronic     Pelvic pain in female     Mittelschmerz     Perineal insufficiency     CARDIOVASCULAR SCREENING; LDL GOAL LESS THAN 160     History of [id number]     Health Care Home     Hypothyroidism, unspecified type     Iron deficiency anemia, unspecified iron deficiency anemia type     Lipoma of skin and subcutaneous tissue     Other neutropenia (H)     Past Surgical History:   Procedure Laterality Date      SECTION STANDBY      [facility] REMOVAL OF OVARY/TUBE(S)      left sided. postpartum ? cyst ? ovarian vein thrombosis     SOFT TISSUE SURGERY      Excision of RUE Lipoma       Social History     Tobacco Use     Smoking status: Never Smoker     Smokeless tobacco: Never Used     Tobacco comment: Nonsmoking household   Substance Use Topics     Alcohol use: No     Family History   Problem Relation Age of Onset     Glaucoma No  family hx of      Macular Degeneration No family hx of              Reviewed and updated as needed this visit by Provider  Allergies  Meds         Review of Systems   As above      Objective    BP 97/61   Pulse 59   Temp 97.6  F (36.4  C) (Oral)   Wt 69.9 kg (154 lb)   SpO2 98%   BMI 25.63 kg/m    Body mass index is 25.63 kg/m .  Physical Exam  Constitutional:       General: She is not in acute distress.  HENT:      Right Ear: Tympanic membrane, ear canal and external ear normal.      Left Ear: Tympanic membrane, ear canal and external ear normal.      Mouth/Throat:      Mouth: Mucous membranes are moist.      Pharynx: No oropharyngeal exudate.   Neck:      Thyroid: Thyromegaly (right larger than left ) present.   Cardiovascular:      Rate and Rhythm: Normal rate and regular rhythm.      Pulses: Normal pulses.      Heart sounds: Normal heart sounds.   Lymphadenopathy:      Cervical: No cervical adenopathy.   Neurological:      Mental Status: She is alert.   Psychiatric:         Mood and Affect: Mood normal.            Diagnostic Test Results:  Labs reviewed in Epic        Assessment & Plan     1. Hypothyroidism, unspecified type  Follow up when labs are back.  Ultrasound ordered.  Last one done 8 years ago.    - TSH with free T4 reflex  - US Thyroid; Future    2. Iron deficiency anemia, unspecified iron deficiency anemia type  As above  - CBC with platelets  - Iron and iron binding capacity  - Ferritin    3. Vitamin D deficiency  As above  - Vitamin D Deficiency    4. Dysmenorrhea  She would like to see ob.  Referral done  - OB/GYN REFERRAL           Return in about 4 months (around 3/18/2020) for Routine Visit.    Joleen Macdonald PA-C  [facility]

## 2019-11-19 ENCOUNTER — TELEPHONE (OUTPATIENT)
Dept: FAMILY MEDICINE | Facility: CLINIC | Age: 49
End: 2019-11-19

## 2019-11-19 DIAGNOSIS — E55.9 VITAMIN D DEFICIENCY: Primary | ICD-10-CM

## 2019-11-19 LAB — DEPRECATED CALCIDIOL+CALCIFEROL SERPL-MC: 11 UG/L (ref 20–75)

## 2019-11-19 RX ORDER — ERGOCALCIFEROL 1.25 MG/1
50000 CAPSULE, LIQUID FILLED ORAL
Qty: 8 CAPSULE | Refills: 0 | Status: SHIPPED | OUTPATIENT
Start: 2019-11-19 | End: 2020-01-08

## 2019-11-19 NOTE — TELEPHONE ENCOUNTER
Please call pt.  Labs are all stable except her vitamin d is low.  I would like her to start a high dose vitamin d supplement for 8 week then take an over the counter 2000 unit vitamin d supplement daily.  I sent the high dose vitamin d to the pharmacy for her.      Joleen Macdonald PA-C

## 2019-11-19 NOTE — TELEPHONE ENCOUNTER
Attempt #1  [phone] (home).      Did patient answer the phone: No, left a message on voicemail to return call to triage line at [phone].    Angela CampbellRN,BSN  [facility]

## 2019-11-20 ENCOUNTER — TELEPHONE (OUTPATIENT)
Dept: FAMILY MEDICINE | Facility: CLINIC | Age: 49
End: 2019-11-20

## 2019-11-20 ENCOUNTER — ANCILLARY PROCEDURE (OUTPATIENT)
Dept: ULTRASOUND IMAGING | Facility: CLINIC | Age: 49
End: 2019-11-20
Attending: PHYSICIAN ASSISTANT
Payer: COMMERCIAL

## 2019-11-20 DIAGNOSIS — E03.9 HYPOTHYROIDISM, UNSPECIFIED TYPE: ICD-10-CM

## 2019-11-20 DIAGNOSIS — E04.1 THYROID NODULE: Primary | ICD-10-CM

## 2019-11-20 PROCEDURE — 76536 US EXAM OF HEAD AND NECK: CPT

## 2019-11-20 NOTE — TELEPHONE ENCOUNTER
Your provider has referred you to: FMG:  [facility]  [phone]  [email or web address]  FMG:  [facility] [phone]  [email or web address]  UMP: [facility] [phone]   [email or web address]  FHN: [facility] [phone]   [email or web address]      Attempt # 1  Called patient at home [phone]  Was call answered?  No answer, left message to call nurse line at [phone] also tried [phone] man answered and instructed nurse to call mobile number [phone] same number as above.          Chana Wright RN  [facility]

## 2019-11-20 NOTE — TELEPHONE ENCOUNTER
Please call pt. She has new thyroid nodules.  I have put in a referral again for endo.      Joleen Macdonald PA-C

## 2019-11-20 NOTE — TELEPHONE ENCOUNTER
Patient returned call to clinic - nurse picked up call - relayed lab result note - see other TE and also US result message from PCP - gave all numbers on the referral and answered all questions to the best of nurse's ability.      Chana Wright RN  [facility]

## 2019-11-20 NOTE — TELEPHONE ENCOUNTER
Patient returned call to clinic - nurse picked up call - relayed lab result note - see other TE and also US result message from PCP - gave all numbers on the referral and answered all questions to the best of nurse's ability.      Chana Wright RN  [facility]

## 2019-11-21 ENCOUNTER — OFFICE VISIT (OUTPATIENT)
Dept: ENDOCRINOLOGY | Facility: CLINIC | Age: 49
End: 2019-11-21
Payer: COMMERCIAL

## 2019-11-21 VITALS
OXYGEN SATURATION: 100 % | WEIGHT: 155 LBS | HEART RATE: 56 BPM | RESPIRATION RATE: 16 BRPM | BODY MASS INDEX: 25.79 KG/M2 | SYSTOLIC BLOOD PRESSURE: 106 MMHG | DIASTOLIC BLOOD PRESSURE: 66 MMHG

## 2019-11-21 DIAGNOSIS — E04.1 THYROID NODULE: Primary | ICD-10-CM

## 2019-11-21 PROCEDURE — 99204 OFFICE O/P NEW MOD 45 MIN: CPT | Performed by: INTERNAL MEDICINE

## 2019-11-21 NOTE — LETTER
2019         RE: Ruth Castellon  [address] [facility] [id number]        Dear Colleague,    Thank you for referring your patient, Ruth Castellon, to the [facility]. Please see a copy of my visit note below.    CC: Thyroid nodule.     HPI: Patient presents for evaluation of a thyroid nodule.   Originally found to have an enlarged thyroid in .     US from 11:  FINDINGS: The right thyroid measures 5.4 x 2.0 x 1.9 cm, 10.7 cc. The  left thyroid measures 4.9 x 1.7 x 1.5 cm, 6.5 cc. Thyroid isthmus  measures 0.5 cm. There is diffuse heterogeneity throughout the thyroid  gland without distinct nodules identified.     IMPRESSION: Diffusely heterogeneous thyroid.     At the same time, she was diagnosed with hypothyroidism and started on levothyroxine,   Now she has begun to notice her neck felt larger and a repeat US was done.     US from 19:  FINDINGS: Right thyroid is 5.2 x 2.0 x 1.8 cm. Left thyroid is 5.0 x  1.6 x 1.5 cm. Thyroid isthmus is 0.3 cm.     Thyroid nodules as follows:  1. New 1.2 x 1.0 x 0.9 cm left mid thyroid nodule appears mildly  hyperechoic and solid without calcifications.  2. New 0.8 x 0.7 x 0.5 cm isoechoic solid left mid thyroid nodule. No  calcifications.                                                                  IMPRESSION: Two new solid left thyroid nodules as above.    While she noticed the growth in her neck, it has not caused her any discomfort.   No hoarseness.     No prior work with radiation.     ROS: 10 point ROS neg other than the symptoms noted above in the HPI.    PMH:   Patient Active Problem List   Diagnosis     Dysmenorrhea     Vitamin D deficiency     Enlarged thyroid gland     Gastritis, chronic     Pelvic pain in female     Mittelschmerz     Perineal insufficiency     CARDIOVASCULAR SCREENING; LDL GOAL LESS THAN 160     History of S-[id number]     Health Care Home     Hypothyroidism, unspecified type     Iron deficiency  "anemia, unspecified iron deficiency anemia type     Lipoma of skin and subcutaneous tissue     Other neutropenia (H)     Meds:  Current Outpatient Medications   Medication     levothyroxine (SYNTHROID/LEVOTHROID) 50 MCG tablet     vitamin D2 (ERGOCALCIFEROL) 05037 units (1250 mcg) capsule     vitamin D3 (CHOLECALCIFEROL) 2000 units tablet     No current facility-administered medications for this visit.      FHX:   No thyroid disease.     SHX:  Non-smoker.     Exam:   Vital signs:      BP: 106/66 Pulse: 56   Resp: 16 SpO2: 100 %       Weight: 70.3 kg (155 lb)  Estimated body mass index is 25.79 kg/m  as calculated from the following:    Height as of 2/11/19: 1.651 m (5' 5\").    Weight as of this encounter: 70.3 kg (155 lb).  Gen: In NAD.   HEENT: no proptosis or lid lag, EOMI, thyroid enlarged R>L  Card: S1 S2 RRR no m/r/g. no LE edema.   Pulm: CTA b/l.   GI: NT ND +BS.   MSK: no gross deformities.   Derm: no rashes or lesions.   Neuro: no tremor, +2 DTR's.     A/P:   Thyroid nodule - I have reviewed the natural history of thyroid nodules and thyroid cancer with the patient. TSH 1.15 on levothyroxine. Images reviewed. No FNA needed at this time. No compressive symptoms.   -No biopsy needed.  -Thyroid ultrasound and lab in 1 year.   -See me in 1 year.     Bassam Villanueva MD on 11/21/2019 at 2:13 PM          Again, thank you for allowing me to participate in the care of your patient.        Sincerely,        Bassam Villanueva MD    "

## 2019-11-21 NOTE — PROGRESS NOTES
CC: Thyroid nodule.     HPI: Patient presents for evaluation of a thyroid nodule.   Originally found to have an enlarged thyroid in .     US from 11:  FINDINGS: The right thyroid measures 5.4 x 2.0 x 1.9 cm, 10.7 cc. The  left thyroid measures 4.9 x 1.7 x 1.5 cm, 6.5 cc. Thyroid isthmus  measures 0.5 cm. There is diffuse heterogeneity throughout the thyroid  gland without distinct nodules identified.     IMPRESSION: Diffusely heterogeneous thyroid.     At the same time, she was diagnosed with hypothyroidism and started on levothyroxine,   Now she has begun to notice her neck felt larger and a repeat US was done.     US from 19:  FINDINGS: Right thyroid is 5.2 x 2.0 x 1.8 cm. Left thyroid is 5.0 x  1.6 x 1.5 cm. Thyroid isthmus is 0.3 cm.     Thyroid nodules as follows:  1. New 1.2 x 1.0 x 0.9 cm left mid thyroid nodule appears mildly  hyperechoic and solid without calcifications.  2. New 0.8 x 0.7 x 0.5 cm isoechoic solid left mid thyroid nodule. No  calcifications.                                                                  IMPRESSION: Two new solid left thyroid nodules as above.    While she noticed the growth in her neck, it has not caused her any discomfort.   No hoarseness.     No prior work with radiation.     ROS: 10 point ROS neg other than the symptoms noted above in the HPI.    PMH:   Patient Active Problem List   Diagnosis     Dysmenorrhea     Vitamin D deficiency     Enlarged thyroid gland     Gastritis, chronic     Pelvic pain in female     Mittelschmerz     Perineal insufficiency     CARDIOVASCULAR SCREENING; LDL GOAL LESS THAN 160     History of [id number]     Health Care Home     Hypothyroidism, unspecified type     Iron deficiency anemia, unspecified iron deficiency anemia type     Lipoma of skin and subcutaneous tissue     Other neutropenia (H)     Meds:  Current Outpatient Medications   Medication     levothyroxine (SYNTHROID/LEVOTHROID) 50 MCG tablet     vitamin D2  "(ERGOCALCIFEROL) 62092 units (1250 mcg) capsule     vitamin D3 (CHOLECALCIFEROL) 2000 units tablet     No current facility-administered medications for this visit.      FHX:   No thyroid disease.     SHX:  Non-smoker.     Exam:   Vital signs:      BP: 106/66 Pulse: 56   Resp: 16 SpO2: 100 %       Weight: 70.3 kg (155 lb)  Estimated body mass index is 25.79 kg/m  as calculated from the following:    Height as of 2/11/19: 1.651 m (5' 5\").    Weight as of this encounter: 70.3 kg (155 lb).  Gen: In NAD.   HEENT: no proptosis or lid lag, EOMI, thyroid enlarged R>L  Card: S1 S2 RRR no m/r/g. no LE edema.   Pulm: CTA b/l.   GI: NT ND +BS.   MSK: no gross deformities.   Derm: no rashes or lesions.   Neuro: no tremor, +2 DTR's.     A/P:   Thyroid nodule - I have reviewed the natural history of thyroid nodules and thyroid cancer with the patient. TSH 1.15 on levothyroxine. Images reviewed. No FNA needed at this time. No compressive symptoms.   -No biopsy needed.  -Thyroid ultrasound and lab in 1 year.   -See me in 1 year.     Bassam Villanueva MD on 11/21/2019 at 2:13 PM        "

## 2019-12-20 ENCOUNTER — OFFICE VISIT (OUTPATIENT)
Dept: OBGYN | Facility: CLINIC | Age: 49
End: 2019-12-20
Payer: COMMERCIAL

## 2019-12-20 VITALS
WEIGHT: 152 LBS | DIASTOLIC BLOOD PRESSURE: 66 MMHG | TEMPERATURE: 97 F | HEART RATE: 83 BPM | SYSTOLIC BLOOD PRESSURE: 110 MMHG | BODY MASS INDEX: 25.29 KG/M2

## 2019-12-20 DIAGNOSIS — R10.2 PELVIC PAIN IN FEMALE: Primary | ICD-10-CM

## 2019-12-20 DIAGNOSIS — N92.6 IRREGULAR PERIODS: ICD-10-CM

## 2019-12-20 DIAGNOSIS — Z12.4 CERVICAL CANCER SCREENING: ICD-10-CM

## 2019-12-20 PROCEDURE — G0145 SCR C/V CYTO,THINLAYER,RESCR: HCPCS | Performed by: OBSTETRICS & GYNECOLOGY

## 2019-12-20 PROCEDURE — 99243 OFF/OP CNSLTJ NEW/EST LOW 30: CPT | Performed by: OBSTETRICS & GYNECOLOGY

## 2019-12-20 PROCEDURE — 87624 HPV HI-RISK TYP POOLED RSLT: CPT | Performed by: OBSTETRICS & GYNECOLOGY

## 2019-12-20 NOTE — PROGRESS NOTES
"GYN Clinic Visit    Date of visit: 2019     Chief Complaint:   Chief Complaint   Patient presents with     Flank Pain       HPI:   Ruth Castellon is a 49 year old  who presents to clinic today in consultation for pelvic pain from Joleen Macdonald    Per PCP note on 19:  \"Having some right side pain that comes and goes.  Starts about a week after period.  Wants to see OB.  No nausea or vomiting.  Periods are less.  Does sometimes get pain during the months she doesn't have her period.  No pain related to food or bm.  bm daily.\"    For the last 8-12 months, periods irregular.  At most 3 months between periods.    Has pain on right side during period, as well as 1 week after.  Continues for about 10 days of the month.  Off and on pain.   Not severe.  Sharp pain.  7/10 at the worst, usually after the period for 4-5 days.  Not as severe during period, about 4/10.  Always had cramps with period. This pain is new/different    Medications:  levothyroxine (SYNTHROID/LEVOTHROID) 50 MCG tablet, Take 1 tablet (50 mcg) by mouth daily  vitamin D2 (ERGOCALCIFEROL) 52239 units (1250 mcg) capsule, Take 1 capsule (50,000 Units) by mouth every 7 days for 8 doses  vitamin D3 (CHOLECALCIFEROL) 2000 units tablet, Take 1 tablet by mouth daily    No current facility-administered medications on file prior to visit.       Allergy:  No Known Allergies  Patient denies food, latex or environmental allergies.     Obstetric History:   OB History    Para Term  AB Living   10 8 7 1 2 8   SAB TAB Ectopic Multiple Live Births   2 0 0 0 8      # Outcome Date GA Lbr Bert/2nd Weight Sex Delivery Anes PTL Lv   10 Term 11 40w5d 05:30 / 00:03 3.544 kg (7 lb 13 oz) F  None  [facility]      Name: SHASTA CASTELLON      Apgar1: 7  Apgar5: 9   9 Term 05 40w0d  3.175 kg (7 lb) F    [facility]   8 Term 03 40w0d  3.175 kg (7 lb) M    [facility]   7 Term 01 40w0d  4.082 kg (9 lb) F    [facility]   6 Term 99 40w0d  " 3.175 kg (7 lb) M    [facility]   5 1998           4 Term 97 40w0d  3.175 kg (7 lb) F    [facility]      Birth Comments: induced labor   3 Term 96 40w0d  3.629 kg (8 lb) M    [facility]   2 1995           1  94 32w0d  1.644 kg (3 lb 10 oz) F CS-Unspec   [facility]      Birth Comments: high BP       Gynecologic History:  Menses: irregular every 1-3 months   No LMP recorded.  STI history: denies  Last Pap: 2015  History of abnormal pap: denies   Contraceptive History: Nothing currently, but pills in the past.  The patient is not currently sexually active      Past Medical History:   Diagnosis Date     Enlarged thyroid gland     normal function       Past Surgical History:   Procedure Laterality Date      SECTION STANDBY      [facility] REMOVAL OF OVARY/TUBE(S)      left sided. postpartum ? cyst ? ovarian vein thrombosis     SOFT TISSUE SURGERY      Excision of RUE Lipoma       Social History:  Alcohol use  Social History    Substance and Sexual Activity      Alcohol use: No    Tobacco use  History   Smoking Status     Never Smoker   Smokeless Tobacco     Never Used     Comment: Nonsmoking household     Drug use  History   Drug Use No     Sexual history  History   Sexual Activity     Sexual activity: Yes     Partners: Male         Family History   Problem Relation Age of Onset     Glaucoma No family hx of      Macular Degeneration No family hx of        Health screening:   Breast cancer screening: due  Colon cancer screening: n/a    Review of Systems:  Review Of Systems  Gen: No change in weight, no fever, no chills, no fatigue  Skin:  No abnormal lesions or rashes  CV: no palpitations, no chest pain, no hypertension, no syncope  Resp: no shortness of breath, no cough, no wheezing, no asthma  GI: no nausea, no vomiting, no diarrhea, no constipation, no bloating, no GERD  :  no vaginal discharge, no dysuria, no abnormal bleeding, ++ pelvic pain   Endo: no thyroid problems  (but enlarged), no cold/heat intolerance, no acne, no hirsutism, no diabetes  Heme: no easy bruising or bleeding, no history of DVT/PE/CVA  Musculoskeletal: no joint pain or weakness, no muscular pain or weakness  Psychiatric: no depression, no anxiety  Neuro: no headaches, no seizures, no strokes, no focal deficits        Physical Exam:  Vitals:    19 1319   BP: 110/66   BP Location: Left arm   Patient Position: Sitting   Cuff Size: Adult Regular   Pulse: 83   Temp: 97  F (36.1  C)   TempSrc: Oral   Weight: 68.9 kg (152 lb)       Gen: NAD, Awake and alert, cooperative with exam  HEENT: normocephalic, atraumatic. Anicteric sclerae. Moist mucus membranes without lesions or erythema.   Neck: supple without palpable cervical adenopathy or thyromegaly  CV: regular rate and rhythm  Resp: lungs clear to ascultation bilaterally  Abd: soft, nontender, nondistended, no rebound, no guarding  Extremities: nontender, no edema  : normal appearing external genitalia, intact normal appearing vaginal mucosa without lesions or abnormal discharge; cervix appears ; pap smear obtained; bimanual exam reveals normal size anteverted, non-tender uterus, no palpable uterine or adnexal masses.  Tenderness to palpation over right adnexa      Assessment:  Ruth Castellon is a 49 year old  who presents with chief complaint   Chief Complaint   Patient presents with     Flank Pain     1. Pelvic pain in female  Has tenderness over right adnexa.  Unable to palpate any adnexal masses, but exam somewhat limited due to patient discomfort.  Will follow-up US results  - US Transvaginal Non OB; Future    2. Cervical cancer screening  - Pap imaged thin layer screen with HPV - recommended age 30 - 65 years (select HPV order below)  - HPV High Risk Types DNA Cervical    3.  Irregular periods  Suspect patient is jeromy-menopausal.  Periods aren't particularly heavy, even when a few months between.  Will cont to monitor at this point.    Follow up:  after US    Paola Gallegos MD

## 2019-12-20 NOTE — NURSING NOTE
"Chief Complaint   Patient presents with     Flank Pain       Initial /66 (BP Location: Left arm, Patient Position: Sitting, Cuff Size: Adult Regular)   Pulse 83   Temp 97  F (36.1  C) (Oral)   Wt 68.9 kg (152 lb)   BMI 25.29 kg/m   Estimated body mass index is 25.29 kg/m  as calculated from the following:    Height as of 19: 1.651 m (5' 5\").    Weight as of this encounter: 68.9 kg (152 lb).  BP completed using cuff size: regular    Questioned patient about current smoking habits.  Pt. has never smoked.          The following HM Due: NONE      The following patient reported/Care Every where data was sent to:  P ABSTRACT QUALITY INITIATIVES [21591]  janeth Rain MA           "

## 2019-12-20 NOTE — LETTER
December 30, 2019    Ruth Castellon  [address] [facility] [address]    Dear ,  This letter is regarding your recent Pap smear (cervical cancer screening) and Human Papillomavirus (HPV) test.  We are happy to inform you that your Pap smear result is normal. Cervical cancer is closely linked with certain types of HPV. Your results showed no evidence of high-risk HPV.  We recommend you have your next PAP smear and HPV test in 5 years.  You will still need to return to the clinic every year for an annual exam and other preventive tests.  If you have additional questions regarding this result, please call our registered nurse, Marily at [phone].  Sincerely,    Paola Gallegos MD //[facility]

## 2019-12-24 LAB
COPATH REPORT: NORMAL
PAP: NORMAL

## 2019-12-26 LAB
FINAL DIAGNOSIS: NORMAL
HPV HR 12 DNA CVX QL NAA+PROBE: NEGATIVE
HPV16 DNA SPEC QL NAA+PROBE: NEGATIVE
HPV18 DNA SPEC QL NAA+PROBE: NEGATIVE
SPECIMEN DESCRIPTION: NORMAL
SPECIMEN SOURCE CVX/VAG CYTO: NORMAL

## 2020-03-10 DIAGNOSIS — E55.9 VITAMIN D DEFICIENCY: ICD-10-CM

## 2020-03-10 NOTE — TELEPHONE ENCOUNTER
"Requested Prescriptions   Pending Prescriptions Disp Refills     vitamin D3 (CHOLECALCIFEROL) 2000 units (50 mcg) tablet 100 tablet 3     Sig: Take 1 tablet (2,000 Units) by mouth daily     Last Written Prescription Date:  12/24/18  Last Fill Quantity: 100,  # refills: 3   Last office visit: 11/18/2019 with prescribing provider:     Future Office Visit:        Vitamin Supplements (Adult) Protocol Passed - 3/10/2020  2:41 PM        Passed - High dose Vitamin D not ordered        Passed - Recent (12 mo) or future (30 days) visit within the authorizing provider's specialty     Patient has had an office visit with the authorizing provider or a provider within the authorizing providers department within the previous 12 mos or has a future within next 30 days. See \"Patient Info\" tab in inbasket, or \"Choose Columns\" in Meds & Orders section of the refill encounter.              Passed - Medication is active on med list             "

## 2020-03-11 RX ORDER — CHOLECALCIFEROL (VITAMIN D3) 50 MCG
2000 TABLET ORAL DAILY
Qty: 100 TABLET | Refills: 0 | Status: SHIPPED | OUTPATIENT
Start: 2020-03-11 | End: 2023-03-07

## 2020-03-11 NOTE — TELEPHONE ENCOUNTER
Prescription approved per [facility] Refill Protocol.    Kelly Tineo, RN, BSN, PHN  [facility]

## 2020-03-12 DIAGNOSIS — E03.9 HYPOTHYROIDISM, UNSPECIFIED TYPE: ICD-10-CM

## 2020-03-13 NOTE — TELEPHONE ENCOUNTER
"Requested Prescriptions   Pending Prescriptions Disp Refills     levothyroxine (SYNTHROID/LEVOTHROID) 50 MCG tablet [Pharmacy Med Name: LEVOTHYROXINE SODIUM 50MCG TABS] 90 tablet 3     Sig: TAKE ONE TABLET BY MOUTH ONCE DAILY       Thyroid Protocol Passed - 3/12/2020  4:14 PM        Passed - Patient is 12 years or older        Passed - Recent (12 mo) or future (30 days) visit within the authorizing provider's specialty     Patient has had an office visit with the authorizing provider or a provider within the authorizing providers department within the previous 12 mos or has a future within next 30 days. See \"Patient Info\" tab in inbasket, or \"Choose Columns\" in Meds & Orders section of the refill encounter.              Passed - Medication is active on med list        Passed - Normal TSH on file in past 12 months     Recent Labs   Lab Test 11/18/19  0941   TSH 1.15              Passed - No active pregnancy on record     If patient is pregnant or has had a positive pregnancy test, please check TSH.          Passed - No positive pregnancy test in past 12 months     If patient is pregnant or has had a positive pregnancy test, please check TSH.             Last Written Prescription Date:  1/31/19  Last Fill Quantity: 90,  # refills: 3   Last office visit: 11/18/2019 with prescribing provider:  Joleen Macdonald     Future Office Visit:      "

## 2020-03-16 RX ORDER — LEVOTHYROXINE SODIUM 50 UG/1
TABLET ORAL
Qty: 90 TABLET | Refills: 3 | Status: SHIPPED | OUTPATIENT
Start: 2020-03-16 | End: 2021-03-22

## 2020-03-16 NOTE — TELEPHONE ENCOUNTER
Prescription approved per [facility] Refill Protocol.    Marsha Hemphill, PharmD  [facility]

## 2020-06-24 ENCOUNTER — TELEPHONE (OUTPATIENT)
Dept: FAMILY MEDICINE | Facility: CLINIC | Age: 50
End: 2020-06-24

## 2020-06-24 NOTE — TELEPHONE ENCOUNTER
Attempt # 1  Called patient at home [phone] (home)  Was call answered?  Yes,  Left knee so swollen cannot bend, no injury, thinks over did it with standing and walking. Uses hot pack, ices at night, massage nothing helping. Tried to work but is unable due to cannot sit because cannot bend knee.  Underneath the cap on backside of knee can feel a bump and is painful when touches.  Standing rates pain 5/10.    Do you have:     Fever >100.0 -NO  New cough -NO    Shortness of breath -NO    Chills -NO    New loss of taste or smell -NO    Generalized body aches -NO    New persistent headache -NO    New sore throat -NO    New rash -NO    Nausea, vomiting or diarrhea -NO       Within the past 3 weeks, have you been exposed to someone with a known positive COVID 19 test? -NO    Have you traveled anywhere? -NO    Scheduled Monday 6/29/2020 with Elton - patient requesting female provider.        Chana Wright RN  [facility]

## 2020-06-29 ENCOUNTER — OFFICE VISIT (OUTPATIENT)
Dept: FAMILY MEDICINE | Facility: CLINIC | Age: 50
End: 2020-06-29
Payer: COMMERCIAL

## 2020-06-29 VITALS
BODY MASS INDEX: 23.78 KG/M2 | WEIGHT: 148 LBS | HEIGHT: 66 IN | DIASTOLIC BLOOD PRESSURE: 69 MMHG | SYSTOLIC BLOOD PRESSURE: 107 MMHG | HEART RATE: 62 BPM

## 2020-06-29 DIAGNOSIS — M71.22 SYNOVIAL CYST OF LEFT POPLITEAL SPACE: ICD-10-CM

## 2020-06-29 DIAGNOSIS — M25.462 EFFUSION OF LEFT KNEE: ICD-10-CM

## 2020-06-29 DIAGNOSIS — M25.562 ACUTE PAIN OF LEFT KNEE: Primary | ICD-10-CM

## 2020-06-29 PROCEDURE — 99213 OFFICE O/P EST LOW 20 MIN: CPT | Performed by: NURSE PRACTITIONER

## 2020-06-29 ASSESSMENT — MIFFLIN-ST. JEOR: SCORE: 1318.07

## 2020-06-29 NOTE — PATIENT INSTRUCTIONS
Patient Education     RICE     Rest an injury, elevate it, and use ice and compression as directed.   RICE stands for rest, ice, compression, and elevation. These can limit pain and swelling after an injury. RICE may be recommended to help treat breaks (fractures), sprains, strains, and bruises or bumps.   Home care  Here are the details of RICE:    Rest. Limit the use of the injured body part. This helps prevent further damage to the body part and gives it time to heal. In some cases, you may need a sling, brace, splint, or cast to help keep the body part still until it has healed.    Ice. Applying ice right after an injury helps relieve pain and swelling. To make an ice pack, put ice cubes in a plastic bag that seals at the top. Wrap the bag in a clean, thin towel or cloth. Then place it over the injured area. Do this for 10 to 15 minutes every 3 to 4 hours. Continue for the next 1 to 3 days or until your symptoms improve. Never put ice directly on your skin. Don't ice an area longer than 15 minutes at a time.    Compression. Putting pressure on an injury helps reduce swelling and provides support. Wrap the injured area firmly with an elastic bandage or wrap. Make sure not to wrap the bandage too tightly or you will cut off blood flow to the injured area. If your bandage loosens, rewrap it.    Elevation. Keeping an injury raised or elevated above the level of your heart reduces swelling, pain, and throbbing. For instance, if you have a broken leg, it may help to rest your leg on several pillows when sitting or lying down. Try to keep the injured area elevated as often as possible.  Follow-up care  Follow up with your healthcare provider, or as advised.  When to seek medical advice  Call your healthcare provider right away if any of these occur:    Fever of 100.4 F (38 C) or higher, or as directed by your healthcare provider    Chills    Increased pain or swelling in the injured body part    Injured body part  becomes cold, blue, numb, or tingly    Signs of infection. These include warmth in the skin, redness, drainage, or bad smell coming from the injured body part.  Date Last Reviewed: 6/1/2018 2000-2019 [employer]. [address] [place] [address]. All rights reserved. This information is not intended as a substitute for professional medical care. Always follow your healthcare professional's instructions.

## 2020-06-29 NOTE — PROGRESS NOTES
"Subjective     Ruth Castellon is a 50 year old female who presents to clinic today for the following health issues:    HPI   Joint Pain    Onset: 2-3 weeks    Description:   Location: left knee  Character: Sharp, swelling, warm/hot. difficulty bending    Progression of Symptoms: same    Accompanying Signs & Symptoms:  Other symptoms: none    History:   Previous similar pain: no       Precipitating factors:   Trauma or overuse: no     Therapies Tried and outcome: knee brace with and with out hinges, compression sleeve, icy hot/hot patch, ice.     She denies acute injury. She states she is on her feet a lot at work. She works as a PCA. She denies history of DVT or family history of DVTs. She denies history of gout. She denies fevers. She reports a decrease in the swelling behind her left knee recently. She has not taken any medications for the pain.     Current Outpatient Medications   Medication Sig Dispense Refill     levothyroxine (SYNTHROID/LEVOTHROID) 50 MCG tablet TAKE ONE TABLET BY MOUTH ONCE DAILY 90 tablet 3     vitamin D3 (CHOLECALCIFEROL) 2000 units (50 mcg) tablet Take 1 tablet (2,000 Units) by mouth daily 100 tablet 0     No Known Allergies    Reviewed and updated as needed this visit by Provider         Review of Systems   Constitutional, HEENT, cardiovascular, pulmonary, gi and gu systems are negative, except left knee pain       Objective    /69 (BP Location: Right arm, Patient Position: Sitting, Cuff Size: Adult Regular)   Pulse 62   Ht 1.676 m (5' 6\")   Wt 67.1 kg (148 lb)   BMI 23.89 kg/m    Body mass index is 23.89 kg/m .  Physical Exam   GENERAL: healthy, alert and no distress  EYES: Eyes grossly normal to inspection, and conjunctivae and sclerae normal  RESP: even and un-labored respirations   CV: peripheral pulses strong  MS: tenderness to left popliteal area with small amount of swelling compared to right. No erythema noted to left knee. Full ROM noted   SKIN: no suspicious lesions or " rashes  NEURO: mentation intact and speech normal  PSYCH: mentation appears normal, affect normal/bright        Assessment & Plan     1. Acute pain of left knee  Suspicious for bakers cyst, will get ultrasound to further assess. Discussed RICE treatment.    - US Lower Extremity Venous Duplex Left; Future       Return in about 1 week (around 7/6/2020) for If symptoms worsen or fail to improve.    Perlita Conde NP  [facility]

## 2020-07-01 ENCOUNTER — ANCILLARY PROCEDURE (OUTPATIENT)
Dept: ULTRASOUND IMAGING | Facility: CLINIC | Age: 50
End: 2020-07-01
Attending: NURSE PRACTITIONER
Payer: COMMERCIAL

## 2020-07-01 DIAGNOSIS — M25.562 ACUTE PAIN OF LEFT KNEE: ICD-10-CM

## 2020-07-01 PROCEDURE — 93971 EXTREMITY STUDY: CPT | Mod: LT

## 2020-07-02 ENCOUNTER — TELEPHONE (OUTPATIENT)
Dept: FAMILY MEDICINE | Facility: CLINIC | Age: 50
End: 2020-07-02

## 2020-07-02 NOTE — TELEPHONE ENCOUNTER
Patient/family was instructed to return call to [facility], directly on the RN Call back line at [phone].  Shira Brink RN

## 2020-07-02 NOTE — TELEPHONE ENCOUNTER
line used.  Left message for patient to please call back to discuss results.      Zehra Dhillon RN

## 2020-07-02 NOTE — TELEPHONE ENCOUNTER
Please call pt to let her know that her ultrasound showed a probable bakers cyst and knee effusion, negative for DVT. Encourage MIKY. Referral placed to orthopedics for possible aspiration and steroid injection as symptoms have been going on for a few weeks regardless of conservative measures.

## 2020-07-03 NOTE — TELEPHONE ENCOUNTER
Patient states she does not need an .  She verbalized understanding of below message and agreed to plan.    Alan Kaiser RN

## 2020-07-09 ENCOUNTER — OFFICE VISIT (OUTPATIENT)
Dept: ORTHOPEDICS | Facility: CLINIC | Age: 50
End: 2020-07-09
Attending: NURSE PRACTITIONER
Payer: COMMERCIAL

## 2020-07-09 ENCOUNTER — ANCILLARY PROCEDURE (OUTPATIENT)
Dept: GENERAL RADIOLOGY | Facility: CLINIC | Age: 50
End: 2020-07-09
Attending: ORTHOPAEDIC SURGERY
Payer: COMMERCIAL

## 2020-07-09 VITALS
HEIGHT: 66 IN | RESPIRATION RATE: 12 BRPM | HEART RATE: 72 BPM | WEIGHT: 148 LBS | BODY MASS INDEX: 23.78 KG/M2 | DIASTOLIC BLOOD PRESSURE: 76 MMHG | SYSTOLIC BLOOD PRESSURE: 114 MMHG

## 2020-07-09 DIAGNOSIS — M25.562 ACUTE PAIN OF LEFT KNEE: ICD-10-CM

## 2020-07-09 DIAGNOSIS — M71.22 SYNOVIAL CYST OF LEFT POPLITEAL SPACE: ICD-10-CM

## 2020-07-09 DIAGNOSIS — M25.462 EFFUSION OF LEFT KNEE: ICD-10-CM

## 2020-07-09 PROCEDURE — 99244 OFF/OP CNSLTJ NEW/EST MOD 40: CPT | Performed by: ORTHOPAEDIC SURGERY

## 2020-07-09 PROCEDURE — 73562 X-RAY EXAM OF KNEE 3: CPT | Mod: LT

## 2020-07-09 ASSESSMENT — MIFFLIN-ST. JEOR: SCORE: 1318.07

## 2020-07-09 NOTE — LETTER
2020         RE: Ruth Castellon  [address] [facility] [address]        Dear Colleague,    Thank you for referring your patient, Ruth Castellon, to the [facility]. Please see a copy of my visit note below.    Ruth Castellon is a 50 year old female who is seen in consultation at the request of Perlita Conde for left knee pain and swelling.  She has had pain in the knee and down the calf since 2020.  She does not recall a history of injury at that time.  She has shooting pain rated up to 8 out of 10.  It is worse when she tries to sit down,  better if she stands.    She had ultrasound to rule out a Baker's cyst and it showed a Baker's cyst.  No MRI scan was performed to evaluate the cause of the Baker's cyst.    No x-ray was performed, so we will perform this today.  This showed minimal arthritic changes of the medial joint and patellofemoral joint.    Past Medical History:   Diagnosis Date     Enlarged thyroid gland     normal function       Past Surgical History:   Procedure Laterality Date      SECTION [facility]      [facility] REMOVAL OF OVARY/TUBE(S)      left sided. postpartum ? cyst ? ovarian vein thrombosis     SOFT TISSUE SURGERY      Excision of RUE Lipoma       Family History   Problem Relation Age of Onset     Glaucoma No family hx of      Macular Degeneration No family hx of        Social History     Socioeconomic History     Marital status:      Spouse name: Not on file     Number of children: Not on file     Years of education: Not on file     Highest education level: Not on file   Occupational History     Not on file   Social Needs     Financial resource strain: Not on file     Food insecurity     Worry: Not on file     Inability: Not on file     Transportation needs     Medical: Not on file     Non-medical: Not on file   Tobacco Use     Smoking status: Never Smoker     Smokeless tobacco: Never Used     Tobacco comment: Nonsmoking  household   Substance and Sexual Activity     Alcohol use: No     Drug use: No     Sexual activity: Yes     Partners: Male   Lifestyle     Physical activity     Days per week: Not on file     Minutes per session: Not on file     Stress: Not on file   Relationships     Social connections     Talks on phone: Not on file     Gets together: Not on file     Attends Buddhism service: Not on file     Active member of club or organization: Not on file     Attends meetings of clubs or organizations: Not on file     Relationship status: Not on file     Intimate partner violence     Fear of current or ex partner: Not on file     Emotionally abused: Not on file     Physically abused: Not on file     Forced sexual activity: Not on file   Other Topics Concern     Parent/sibling w/ CABG, MI or angioplasty before 65F 55M? No   Social History Narrative     Not on file       Current Outpatient Medications   Medication Sig Dispense Refill     levothyroxine (SYNTHROID/LEVOTHROID) 50 MCG tablet TAKE ONE TABLET BY MOUTH ONCE DAILY 90 tablet 3     vitamin D3 (CHOLECALCIFEROL) 2000 units (50 mcg) tablet Take 1 tablet (2,000 Units) by mouth daily 100 tablet 0       No Known Allergies    REVIEW OF SYSTEMS:  CONSTITUTIONAL:  NEGATIVE for fever, chills, change in weight, not feeling tired  SKIN:  NEGATIVE for worrisome rashes, no skin lumps, no skin ulcers and no non-healing wounds  EYES:  NEGATIVE for vision changes or irritation.  ENT/MOUTH:  NEGATIVE.  No hearing loss, no hoarseness, no difficulty swallowing.  RESP:  NEGATIVE. No cough or shortness of breath.  CV:  NEGATIVE for chest pain, palpitations or peripheral edema  GI:  NEGATIVE for nausea, abdominal pain, heartburn, or change in bowel habits  :  Negative. No dysuria, no hematuria  MUSCULOSKELETAL:  See HPI above  NEURO:  NEGATIVE . No headaches, no dizziness,  no numbness  ENDOCRINE:  NEGATIVE for temperature intolerance, skin/hair changes  HEME/ALLERGY/IMMUNE:  NEGATIVE for  "bleeding problems  PSYCHIATRIC:  NEGATIVE. no anxiety, no depression.      Exam:  Vitals: /76   Pulse 72   Resp 12   Ht 1.676 m (5' 6\")   Wt 67.1 kg (148 lb)   BMI 23.89 kg/m    BMI= Body mass index is 23.89 kg/m .  Constitutional:  healthy, alert and no distress  Neuro: Alert and Oriented x 3, Sensation grossly WNL.  HEENT:  Atraumatic, EOMI  Neck:  Neck supple with no tenderness.  Psych: Affect normal   Respiratory: Breathing not labored.  Cardiovascular: normal peripheral pulses  Lymph: no adenopathy  Skin: No rashes,worrisome lesions or skin problems  Spine: straight, no straight leg raising pain.  Hips show full range of motion.  There is no tenderness over the sacro-iliac joints, sciatic notch, or greater trochanters.   She has full range of motion of both knees.  She does have a moderate effusion on the left knee with swelling at the popliteal space consistent with a Baker's cyst.  She has tenderness both the medial and lateral joint line.  She had diffuse pains with medial and lateral Awilda with pain mainly anteromedial.  She had no ligamentous laxity of MCL, LCL, or cruciates.  Sensation, motor and circulation are intact.    Assessment: Persistent pain in left knee with Baker's cyst.  The Baker's cyst is a sign that something is wrong in the knee.  We will proceed with MRI scan and see her back following this.    Again, thank you for allowing me to participate in the care of your patient.        Sincerely,        Reza Rodarte MD    "

## 2020-07-09 NOTE — PATIENT INSTRUCTIONS
An MRI has been ordered. Please schedule and complete the MRI. Please follow up in clinic once it is obtained to go over the results.   weight-bearing as tolerated

## 2020-07-11 NOTE — PROGRESS NOTES
Ruth Castellon is a 50 year old female who is seen in consultation at the request of Perlita Conde for left knee pain and swelling.  She has had pain in the knee and down the calf since 2020.  She does not recall a history of injury at that time.  She has shooting pain rated up to 8 out of 10.  It is worse when she tries to sit down,  better if she stands.    She had ultrasound to rule out a Baker's cyst and it showed a Baker's cyst.  No MRI scan was performed to evaluate the cause of the Baker's cyst.    No x-ray was performed, so we will perform this today.  This showed minimal arthritic changes of the medial joint and patellofemoral joint.    Past Medical History:   Diagnosis Date     Enlarged thyroid gland     normal function       Past Surgical History:   Procedure Laterality Date      SECTION [facility]      [facility] REMOVAL OF OVARY/TUBE(S)      left sided. postpartum ? cyst ? ovarian vein thrombosis     SOFT TISSUE SURGERY      Excision of RUE Lipoma       Family History   Problem Relation Age of Onset     Glaucoma No family hx of      Macular Degeneration No family hx of        Social History     Socioeconomic History     Marital status:      Spouse name: Not on file     Number of children: Not on file     Years of education: Not on file     Highest education level: Not on file   Occupational History     Not on file   Social Needs     Financial resource strain: Not on file     Food insecurity     Worry: Not on file     Inability: Not on file     Transportation needs     Medical: Not on file     Non-medical: Not on file   Tobacco Use     Smoking status: Never Smoker     Smokeless tobacco: Never Used     Tobacco comment: Nonsmoking household   Substance and Sexual Activity     Alcohol use: No     Drug use: No     Sexual activity: Yes     Partners: Male   Lifestyle     Physical activity     Days per week: Not on file     Minutes per session: Not on file     Stress: Not on  "file   Relationships     Social connections     Talks on phone: Not on file     Gets together: Not on file     Attends Latter day service: Not on file     Active member of club or organization: Not on file     Attends meetings of clubs or organizations: Not on file     Relationship status: Not on file     Intimate partner violence     Fear of current or ex partner: Not on file     Emotionally abused: Not on file     Physically abused: Not on file     Forced sexual activity: Not on file   Other Topics Concern     Parent/sibling w/ CABG, MI or angioplasty before 65F 55M? No   Social History Narrative     Not on file       Current Outpatient Medications   Medication Sig Dispense Refill     levothyroxine (SYNTHROID/LEVOTHROID) 50 MCG tablet TAKE ONE TABLET BY MOUTH ONCE DAILY 90 tablet 3     vitamin D3 (CHOLECALCIFEROL) 2000 units (50 mcg) tablet Take 1 tablet (2,000 Units) by mouth daily 100 tablet 0       No Known Allergies    REVIEW OF SYSTEMS:  CONSTITUTIONAL:  NEGATIVE for fever, chills, change in weight, not feeling tired  SKIN:  NEGATIVE for worrisome rashes, no skin lumps, no skin ulcers and no non-healing wounds  EYES:  NEGATIVE for vision changes or irritation.  ENT/MOUTH:  NEGATIVE.  No hearing loss, no hoarseness, no difficulty swallowing.  RESP:  NEGATIVE. No cough or shortness of breath.  CV:  NEGATIVE for chest pain, palpitations or peripheral edema  GI:  NEGATIVE for nausea, abdominal pain, heartburn, or change in bowel habits  :  Negative. No dysuria, no hematuria  MUSCULOSKELETAL:  See HPI above  NEURO:  NEGATIVE . No headaches, no dizziness,  no numbness  ENDOCRINE:  NEGATIVE for temperature intolerance, skin/hair changes  HEME/ALLERGY/IMMUNE:  NEGATIVE for bleeding problems  PSYCHIATRIC:  NEGATIVE. no anxiety, no depression.      Exam:  Vitals: /76   Pulse 72   Resp 12   Ht 1.676 m (5' 6\")   Wt 67.1 kg (148 lb)   BMI 23.89 kg/m    BMI= Body mass index is 23.89 kg/m .  Constitutional:  " healthy, alert and no distress  Neuro: Alert and Oriented x 3, Sensation grossly WNL.  HEENT:  Atraumatic, EOMI  Neck:  Neck supple with no tenderness.  Psych: Affect normal   Respiratory: Breathing not labored.  Cardiovascular: normal peripheral pulses  Lymph: no adenopathy  Skin: No rashes,worrisome lesions or skin problems  Spine: straight, no straight leg raising pain.  Hips show full range of motion.  There is no tenderness over the sacro-iliac joints, sciatic notch, or greater trochanters.   She has full range of motion of both knees.  She does have a moderate effusion on the left knee with swelling at the popliteal space consistent with a Baker's cyst.  She has tenderness both the medial and lateral joint line.  She had diffuse pains with medial and lateral Awilda with pain mainly anteromedial.  She had no ligamentous laxity of MCL, LCL, or cruciates.  Sensation, motor and circulation are intact.    Assessment: Persistent pain in left knee with Baker's cyst.  The Baker's cyst is a sign that something is wrong in the knee.  We will proceed with MRI scan and see her back following this.

## 2020-07-21 ENCOUNTER — ANCILLARY PROCEDURE (OUTPATIENT)
Dept: MRI IMAGING | Facility: CLINIC | Age: 50
End: 2020-07-21
Attending: PHYSICIAN ASSISTANT
Payer: COMMERCIAL

## 2020-07-21 DIAGNOSIS — M71.22 SYNOVIAL CYST OF LEFT POPLITEAL SPACE: ICD-10-CM

## 2020-07-21 DIAGNOSIS — M25.462 EFFUSION OF LEFT KNEE: ICD-10-CM

## 2020-07-21 DIAGNOSIS — M25.562 ACUTE PAIN OF LEFT KNEE: ICD-10-CM

## 2020-07-21 PROCEDURE — 73721 MRI JNT OF LWR EXTRE W/O DYE: CPT | Mod: TC

## 2020-07-27 ENCOUNTER — OFFICE VISIT (OUTPATIENT)
Dept: ORTHOPEDICS | Facility: CLINIC | Age: 50
End: 2020-07-27
Payer: COMMERCIAL

## 2020-07-27 VITALS
SYSTOLIC BLOOD PRESSURE: 110 MMHG | HEART RATE: 90 BPM | RESPIRATION RATE: 12 BRPM | HEIGHT: 66 IN | WEIGHT: 148 LBS | DIASTOLIC BLOOD PRESSURE: 74 MMHG | BODY MASS INDEX: 23.78 KG/M2

## 2020-07-27 DIAGNOSIS — M17.12 PRIMARY OSTEOARTHRITIS OF LEFT KNEE: ICD-10-CM

## 2020-07-27 DIAGNOSIS — S83.232D COMPLEX TEAR OF MEDIAL MENISCUS OF LEFT KNEE AS CURRENT INJURY, SUBSEQUENT ENCOUNTER: ICD-10-CM

## 2020-07-27 PROBLEM — S83.232A COMPLEX TEAR OF MEDIAL MENISCUS OF LEFT KNEE AS CURRENT INJURY: Status: ACTIVE | Noted: 2020-07-27

## 2020-07-27 PROCEDURE — 99213 OFFICE O/P EST LOW 20 MIN: CPT | Performed by: ORTHOPAEDIC SURGERY

## 2020-07-27 RX ORDER — NAPROXEN 500 MG/1
500 TABLET ORAL 2 TIMES DAILY WITH MEALS
Qty: 60 TABLET | Refills: 11 | Status: SHIPPED | OUTPATIENT
Start: 2020-07-27

## 2020-07-27 ASSESSMENT — MIFFLIN-ST. JEOR: SCORE: 1318.07

## 2020-07-27 NOTE — PROGRESS NOTES
Ruth Castellon returns for her left knee MRI results.  MRI images were independently visualized with the patient.  These showed a medial meniscus tear, the medial meniscus tear consists of complex tear with extrusion, moderate chondromalacia in the medial compartment, mild chrondomalacia in the lateral compartment and mild chondromalacia in the patellofemoral compartment.    Impression:  medial meniscus tear  Left knee chondromalacia.    Thus, our plan is NSAIDS  Naproxen 500 mg twice daily.  Consider injection or arthroscopy if this fails.    Total time spent was 15 minutes; with 15 minutes spent face-to-face with patient discussing test results, treatment options, and estimated recovery time.

## 2020-07-27 NOTE — LETTER
7/27/2020         RE: Ruth Castellon  [address] [facility] [address]        Dear Colleague,    Thank you for referring your patient, Ruth Castellon, to the [facility]. Please see a copy of my visit note below.    Ruth Castellon returns for her left knee MRI results.  MRI images were independently visualized with the patient.  These showed a medial meniscus tear, the medial meniscus tear consists of complex tear with extrusion, moderate chondromalacia in the medial compartment, mild chrondomalacia in the lateral compartment and mild chondromalacia in the patellofemoral compartment.    Impression:  medial meniscus tear  Left knee chondromalacia.    Thus, our plan is NSAIDS  Naproxen 500 mg twice daily.  Consider injection or arthroscopy if this fails.    Total time spent was 15 minutes; with 15 minutes spent face-to-face with patient discussing test results, treatment options, and estimated recovery time.          Again, thank you for allowing me to participate in the care of your patient.        Sincerely,        Reza Rodarte MD

## 2021-03-22 ENCOUNTER — OFFICE VISIT (OUTPATIENT)
Dept: FAMILY MEDICINE | Facility: CLINIC | Age: 51
End: 2021-03-22
Payer: COMMERCIAL

## 2021-03-22 VITALS
DIASTOLIC BLOOD PRESSURE: 52 MMHG | HEART RATE: 88 BPM | WEIGHT: 146 LBS | BODY MASS INDEX: 23.46 KG/M2 | TEMPERATURE: 97.2 F | HEIGHT: 66 IN | SYSTOLIC BLOOD PRESSURE: 110 MMHG

## 2021-03-22 DIAGNOSIS — D70.8 OTHER NEUTROPENIA (H): ICD-10-CM

## 2021-03-22 DIAGNOSIS — E04.1 THYROID NODULE: ICD-10-CM

## 2021-03-22 DIAGNOSIS — D50.9 IRON DEFICIENCY ANEMIA, UNSPECIFIED IRON DEFICIENCY ANEMIA TYPE: ICD-10-CM

## 2021-03-22 DIAGNOSIS — E03.9 HYPOTHYROIDISM, UNSPECIFIED TYPE: Primary | ICD-10-CM

## 2021-03-22 DIAGNOSIS — E55.9 VITAMIN D DEFICIENCY: ICD-10-CM

## 2021-03-22 LAB
BASOPHILS # BLD AUTO: 0 10E9/L (ref 0–0.2)
BASOPHILS NFR BLD AUTO: 0.6 %
DEPRECATED CALCIDIOL+CALCIFEROL SERPL-MC: 12 UG/L (ref 20–75)
DIFFERENTIAL METHOD BLD: ABNORMAL
EOSINOPHIL # BLD AUTO: 0 10E9/L (ref 0–0.7)
EOSINOPHIL NFR BLD AUTO: 0.9 %
ERYTHROCYTE [DISTWIDTH] IN BLOOD BY AUTOMATED COUNT: 13.2 % (ref 10–15)
FERRITIN SERPL-MCNC: 56 NG/ML (ref 8–252)
HCT VFR BLD AUTO: 39.9 % (ref 35–47)
HGB BLD-MCNC: 13.2 G/DL (ref 11.7–15.7)
LYMPHOCYTES # BLD AUTO: 1.2 10E9/L (ref 0.8–5.3)
LYMPHOCYTES NFR BLD AUTO: 38 %
MCH RBC QN AUTO: 29.5 PG (ref 26.5–33)
MCHC RBC AUTO-ENTMCNC: 33.1 G/DL (ref 31.5–36.5)
MCV RBC AUTO: 89 FL (ref 78–100)
MONOCYTES # BLD AUTO: 0.3 10E9/L (ref 0–1.3)
MONOCYTES NFR BLD AUTO: 10.8 %
NEUTROPHILS # BLD AUTO: 1.6 10E9/L (ref 1.6–8.3)
NEUTROPHILS NFR BLD AUTO: 49.7 %
PLATELET # BLD AUTO: 179 10E9/L (ref 150–450)
RBC # BLD AUTO: 4.47 10E12/L (ref 3.8–5.2)
TSH SERPL DL<=0.005 MIU/L-ACNC: 1.58 MU/L (ref 0.4–4)
WBC # BLD AUTO: 3.2 10E9/L (ref 4–11)

## 2021-03-22 PROCEDURE — 85025 COMPLETE CBC W/AUTO DIFF WBC: CPT | Performed by: PHYSICIAN ASSISTANT

## 2021-03-22 PROCEDURE — 82306 VITAMIN D 25 HYDROXY: CPT | Performed by: PHYSICIAN ASSISTANT

## 2021-03-22 PROCEDURE — 36415 COLL VENOUS BLD VENIPUNCTURE: CPT | Performed by: PHYSICIAN ASSISTANT

## 2021-03-22 PROCEDURE — 82728 ASSAY OF FERRITIN: CPT | Performed by: PHYSICIAN ASSISTANT

## 2021-03-22 PROCEDURE — 84443 ASSAY THYROID STIM HORMONE: CPT | Performed by: PHYSICIAN ASSISTANT

## 2021-03-22 PROCEDURE — 99214 OFFICE O/P EST MOD 30 MIN: CPT | Performed by: PHYSICIAN ASSISTANT

## 2021-03-22 RX ORDER — LEVOTHYROXINE SODIUM 50 UG/1
50 TABLET ORAL DAILY
Qty: 90 TABLET | Refills: 3 | Status: SHIPPED | OUTPATIENT
Start: 2021-03-22 | End: 2022-04-28

## 2021-03-22 ASSESSMENT — MIFFLIN-ST. JEOR: SCORE: 1309

## 2021-03-22 NOTE — PROGRESS NOTES
Assessment & Plan     Hypothyroidism, unspecified type  Follow up when labs and ultrasound are back   - TSH WITH FREE T4 REFLEX  - US Thyroid; Future  - levothyroxine (SYNTHROID/LEVOTHROID) 50 MCG tablet; Take 1 tablet (50 mcg) by mouth daily    Other neutropenia (H)  Has been stable.  Recheck this year.    - CBC with platelets and differential    Vitamin D deficiency  Stopped supplements and feeling tired.  Recheck.    - Vitamin D Deficiency    Thyroid nodule  As above  - US Thyroid; Future    Iron deficiency anemia, unspecified iron deficiency anemia type  Hx of.  As above   - Ferritin                 Return in about 3 months (around 6/22/2021) for mood.    SHERINE Mccord [facility] LAKSHMI Miranda is a 50 year old who presents for the following health issues     HPI     Hypothyroidism Follow-up      Since last visit, patient describes the following symptoms: Weight stable, no hair loss, no skin changes, no constipation, no loose stools    Is feeling some fatigue too    Has some pain on right side x one month.  No swelling.  Hasn't had her thyroid evaluated in over a year.  Saw Susy in 2019 who wanted labs and ultrasound yearly.  She lost her  to Covid in Sept 2020 but feels she is doing ok.  Still working.  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  They are managing too she states.        How many servings of fruits and vegetables do you eat daily? 1-2     On average, how many sweetened beverages do you drink each day (Examples: soda, juice, sweet tea, etc.  Do NOT count diet or artificially sweetened beverages)?   1    How many days per week do you exercise enough to make your heart beat faster? 4    How many minutes a day do you exercise enough to make your heart beat faster? 30 - 60    How many days per week do you miss taking your medication? 0        Review of Systems   As above      Objective    /52   Pulse 88   Temp 97.2  F (36.2  C) (Tympanic)   Ht  "1.676 m (5' 6\")   Wt 66.2 kg (146 lb)   BMI 23.57 kg/m    Body mass index is 23.57 kg/m .  Physical Exam  Constitutional:       General: She is not in acute distress.  HENT:      Right Ear: Tympanic membrane, ear canal and external ear normal.      Left Ear: Tympanic membrane, ear canal and external ear normal.      Mouth/Throat:      Mouth: Mucous membranes are moist.      Pharynx: No oropharyngeal exudate.   Neck:      Thyroid: Thyromegaly present.   Cardiovascular:      Rate and Rhythm: Normal rate and regular rhythm.   Pulmonary:      Effort: Pulmonary effort is normal.      Breath sounds: Normal breath sounds.   Lymphadenopathy:      Cervical: Cervical adenopathy (bilateral anterior cervical ) present.   Neurological:      Mental Status: She is alert.   Psychiatric:         Mood and Affect: Mood normal.                        "

## 2021-03-23 ENCOUNTER — TELEPHONE (OUTPATIENT)
Dept: FAMILY MEDICINE | Facility: CLINIC | Age: 51
End: 2021-03-23

## 2021-03-23 DIAGNOSIS — E55.9 VITAMIN D DEFICIENCY: Primary | ICD-10-CM

## 2021-03-23 RX ORDER — ERGOCALCIFEROL 1.25 MG/1
50000 CAPSULE, LIQUID FILLED ORAL
Qty: 8 CAPSULE | Refills: 0 | Status: SHIPPED | OUTPATIENT
Start: 2021-03-23 | End: 2021-03-31

## 2021-03-23 NOTE — TELEPHONE ENCOUNTER
Please let pt know her labs are normal except her vitamin d is low.  Resent prescription for high dose vitamin d.      Joleen Macdonald PA-C

## 2021-03-29 ENCOUNTER — ANCILLARY PROCEDURE (OUTPATIENT)
Dept: ULTRASOUND IMAGING | Facility: CLINIC | Age: 51
End: 2021-03-29
Attending: PHYSICIAN ASSISTANT
Payer: COMMERCIAL

## 2021-03-29 DIAGNOSIS — E04.1 THYROID NODULE: ICD-10-CM

## 2021-03-29 DIAGNOSIS — E03.9 HYPOTHYROIDISM, UNSPECIFIED TYPE: ICD-10-CM

## 2021-03-30 ENCOUNTER — TELEPHONE (OUTPATIENT)
Dept: FAMILY MEDICINE | Facility: CLINIC | Age: 51
End: 2021-03-30

## 2021-03-30 DIAGNOSIS — E04.1 THYROID NODULE: Primary | ICD-10-CM

## 2021-03-30 NOTE — TELEPHONE ENCOUNTER
Please call pt.  She has 2 new nodules.  Only one needs a biopsy.  I have ordered the biopsy.  Please help schedule.  The others will need ultrasound weekly.    Joleen Macdonald PA-C

## 2021-03-30 NOTE — TELEPHONE ENCOUNTER
Called patient and relayed provider message as written.  Provided her with scheduling line for biopsy: [phone]  Patient verbalized good understanding.    Alejandra Escalona, PRIYAN RN  [facility], [place]

## 2021-03-31 ENCOUNTER — TELEPHONE (OUTPATIENT)
Dept: FAMILY MEDICINE | Facility: CLINIC | Age: 51
End: 2021-03-31

## 2021-03-31 DIAGNOSIS — E55.9 VITAMIN D DEFICIENCY: Primary | ICD-10-CM

## 2021-03-31 NOTE — TELEPHONE ENCOUNTER
Signed Prescriptions:                        Disp   Refills    vitamin D3 (CHOLECALCIFEROL) 1.25 MG (5000*8 caps*0        Sig: Take 1 capsule (50,000 Units) by mouth every 7 days           for 8 doses  Authorizing Provider: PHAM TOMAS

## 2021-03-31 NOTE — TELEPHONE ENCOUNTER
Can we change the prescription for a patient for Vitamin d 50,000 to the vitamin d3 instead of vitamin d2?  The vitamin d3 is halal certified.  Patient won't take the other capsule.    Thank you,  Marla Alvarez, PharmD  [facility]  [phone]

## 2021-04-01 ENCOUNTER — TELEPHONE (OUTPATIENT)
Dept: FAMILY MEDICINE | Facility: CLINIC | Age: 51
End: 2021-04-01

## 2021-04-01 NOTE — TELEPHONE ENCOUNTER
"Patient called from radiology  wondering why she had to have a biopsy done.  Informed her that biopsies are typically done to rule out suspicious growths in the body as benign, cancerous, or other.     Patient is comfortable with this, stating \"better to know early and get it taken care of\".  Will also send to PCP as I do not see that the most recent US Thyroid test was read by Provider.        Zehra Dhillon RN  [facility]  "

## 2021-04-19 DIAGNOSIS — Z11.59 ENCOUNTER FOR SCREENING FOR OTHER VIRAL DISEASES: ICD-10-CM

## 2021-06-04 ENCOUNTER — ANCILLARY PROCEDURE (OUTPATIENT)
Dept: ULTRASOUND IMAGING | Facility: CLINIC | Age: 51
End: 2021-06-04
Attending: PHYSICIAN ASSISTANT
Payer: COMMERCIAL

## 2021-06-04 ENCOUNTER — HOSPITAL ENCOUNTER (OUTPATIENT)
Facility: CLINIC | Age: 51
Setting detail: SPECIMEN
End: 2021-06-04
Payer: COMMERCIAL

## 2021-06-04 DIAGNOSIS — E04.1 THYROID NODULE: ICD-10-CM

## 2021-06-04 PROCEDURE — 99N1014 PR STATISTIC CYTO WRIGHT STAIN TC: Performed by: RADIOLOGY

## 2021-06-04 PROCEDURE — 10005 FNA BX W/US GDN 1ST LES: CPT | Performed by: RADIOLOGY

## 2021-06-07 LAB — COPATH REPORT: NORMAL

## 2022-04-26 DIAGNOSIS — E03.9 HYPOTHYROIDISM, UNSPECIFIED TYPE: ICD-10-CM

## 2022-04-28 ENCOUNTER — OFFICE VISIT (OUTPATIENT)
Dept: FAMILY MEDICINE | Facility: CLINIC | Age: 52
End: 2022-04-28
Payer: COMMERCIAL

## 2022-04-28 VITALS
WEIGHT: 151.5 LBS | HEART RATE: 62 BPM | HEIGHT: 64 IN | OXYGEN SATURATION: 98 % | BODY MASS INDEX: 25.86 KG/M2 | TEMPERATURE: 97.7 F | SYSTOLIC BLOOD PRESSURE: 115 MMHG | DIASTOLIC BLOOD PRESSURE: 65 MMHG

## 2022-04-28 DIAGNOSIS — E03.9 HYPOTHYROIDISM, UNSPECIFIED TYPE: Primary | ICD-10-CM

## 2022-04-28 DIAGNOSIS — D70.8 OTHER NEUTROPENIA (H): ICD-10-CM

## 2022-04-28 DIAGNOSIS — D17.30 LIPOMA OF SKIN AND SUBCUTANEOUS TISSUE: ICD-10-CM

## 2022-04-28 DIAGNOSIS — Z12.11 SCREEN FOR COLON CANCER: ICD-10-CM

## 2022-04-28 DIAGNOSIS — Z12.31 VISIT FOR SCREENING MAMMOGRAM: ICD-10-CM

## 2022-04-28 LAB — TSH SERPL DL<=0.005 MIU/L-ACNC: 2.24 MU/L (ref 0.4–4)

## 2022-04-28 PROCEDURE — 36415 COLL VENOUS BLD VENIPUNCTURE: CPT | Performed by: PHYSICIAN ASSISTANT

## 2022-04-28 PROCEDURE — 99213 OFFICE O/P EST LOW 20 MIN: CPT | Performed by: PHYSICIAN ASSISTANT

## 2022-04-28 PROCEDURE — 84443 ASSAY THYROID STIM HORMONE: CPT | Performed by: PHYSICIAN ASSISTANT

## 2022-04-28 RX ORDER — LEVOTHYROXINE SODIUM 50 UG/1
50 TABLET ORAL DAILY
Qty: 90 TABLET | Refills: 0 | Status: SHIPPED | OUTPATIENT
Start: 2022-04-28 | End: 2022-08-02

## 2022-04-28 NOTE — TELEPHONE ENCOUNTER
Prescription approved per [facility] Refill Protocol. Patient has appointment with NEHAL Pineda scheduled for 4/28/22.     Nyla Fatima RN   [facility]

## 2022-04-28 NOTE — PROGRESS NOTES
"  Assessment & Plan     Hypothyroidism, unspecified type    - TSH WITH FREE T4 REFLEX; Future  - TSH WITH FREE T4 REFLEX    Lipoma of skin and subcutaneous tissue  - Orthopedic  Referral; Future    Other neutropenia (H)  Stable.         Return in about 2 weeks (around 5/12/2022) for For specialty consultation.    Dejuan Merino PA-C  [facility] LAKSHMI Miranda is a 51 year old who presents for the following health issues  accompanied by her self.    HPI     Patient presents with:  Lesion: On right foot x 2 years  RECHECK: Thyroid check      Hypothyroidism Follow-up      Since last visit, patient describes the following symptoms: Weight stable, no hair loss, no skin changes, no constipation, no loose stools      How many servings of fruits and vegetables do you eat daily?  2-3    On average, how many sweetened beverages do you drink each day (Examples: soda, juice, sweet tea, etc.  Do NOT count diet or artificially sweetened beverages)?   0    How many days per week do you exercise enough to make your heart beat faster? none    How many minutes a day do you exercise enough to make your heart beat faster? none    How many days per week do you miss taking your medication? 0    Patient has noticed a rubbery lesion on the side of her R foot that is growing.  Had a similar lesion biopsied on her R upper arm which was a lipoma.  She is amenable to podiatry consultation for biopsy. Also presents for thyroid labs.     Review of Systems   Constitutional, HEENT, cardiovascular, pulmonary, gi and gu systems are negative, except as otherwise noted.      Objective    /65   Pulse 62   Temp 97.7  F (36.5  C) (Oral)   Ht 1.635 m (5' 4.37\")   Wt 68.7 kg (151 lb 8 oz)   SpO2 98%   BMI 25.71 kg/m    Body mass index is 25.71 kg/m .  Physical Exam   GENERAL: healthy, alert and no distress  MS: no gross musculoskeletal defects noted, no edema  SKIN: 4 cm lobulated subcutaneous mass. " Area of possible early ulceration noted.

## 2022-05-05 ENCOUNTER — OFFICE VISIT (OUTPATIENT)
Dept: PODIATRY | Facility: CLINIC | Age: 52
End: 2022-05-05
Payer: COMMERCIAL

## 2022-05-05 DIAGNOSIS — M67.40 GANGLION CYST: Primary | ICD-10-CM

## 2022-05-05 PROCEDURE — 99244 OFF/OP CNSLTJ NEW/EST MOD 40: CPT | Performed by: PODIATRIST

## 2022-05-05 NOTE — PROGRESS NOTES
S:  Patient seen today and consult from Dejuan Merino and complains of mass on the right foot.  Point to the dorsum of the lateral tarsometatarsal joint.    Patient has had this for 2 years. Has a burning pain with this.  Aggravated by activity and tight shoes and relieved by rest.  Denies mass on contralateral foot.  Believes this is slowly getting bigger.  Denies numbness.  Denies erythema ecchymosis or weakness.  Has never smoked.  Has history of lipoma removal arm and surgery went well..  She has never smoked.   No family history of masses.    ROS:     A 10-point review of systems was performed and is positive for that noted in the HPI and as seen below.  All other areas are negative.      No Known Allergies    Current Outpatient Medications   Medication Sig Dispense Refill     levothyroxine (SYNTHROID/LEVOTHROID) 50 MCG tablet Take 1 tablet (50 mcg) by mouth daily 90 tablet 0     naproxen (NAPROSYN) 500 MG tablet Take 1 tablet (500 mg) by mouth 2 times daily (with meals) (Patient not taking: No sig reported) 60 tablet 11     vitamin D3 (CHOLECALCIFEROL) 2000 units (50 mcg) tablet Take 1 tablet (2,000 Units) by mouth daily (Patient not taking: No sig reported) 100 tablet 0       Patient Active Problem List   Diagnosis     Dysmenorrhea     Vitamin D deficiency     Enlarged thyroid gland     Gastritis, chronic     Pelvic pain in female     [facility]     Perineal insufficiency     CARDIOVASCULAR SCREENING; LDL GOAL LESS THAN 160     History of K-[id number]     Health Care Home     Hypothyroidism, unspecified type     Iron deficiency anemia, unspecified iron deficiency anemia type     Lipoma of skin and subcutaneous tissue     Other neutropenia (H)     Primary osteoarthritis of left knee     Complex tear of medial meniscus of left knee as current injury       Past Medical History:   Diagnosis Date     Enlarged thyroid gland     normal function     Primary osteoarthritis of left knee 2020       Past  Surgical History:   Procedure Laterality Date      SECTION [facility]      [facility] REMOVAL OF OVARY/TUBE(S)      left sided. postpartum ? cyst ? ovarian vein thrombosis     SOFT TISSUE SURGERY      Excision of RUE Lipoma       Family History   Problem Relation Age of Onset     Glaucoma No family hx of      Macular Degeneration No family hx of        Social History     Tobacco Use     Smoking status: Never Smoker     Smokeless tobacco: Never Used     Tobacco comment: Nonsmoking household   Substance Use Topics     Alcohol use: No           EXAM:    Vitals: There were no vitals taken for this visit.  BMI: There is no height or weight on file to calculate BMI.  Height: Data Unavailable    Constitutional/ general:  Pt is in no apparent distress, appears well-nourished.  Cooperative with history and physical exam.     Psych:  The patient answered questions appropriately.  Normal affect.  Seems to have reasonable expectations, in terms of treatment.     Eyes:  Visual scanning/ tracking without deficit.     Ears:  Response to auditory stimuli is normal.  No hearing aid devices.  Auricles in proper alignment.     Lymphatic:  Popliteal lymph nodes not enlarged.     Lungs:  Non labored breathing, non labored speech. No cough.  No audible wheezing. Even, quiet breathing.       Vascular:  Pedal pulses are palpable bilaterally for both the DP and PT arteries.  CFT < 3 sec.  No edema.  Pedal hair growth noted.    Neuro:  Alert and oriented x 3. Coordinated gait.  Light touch sensation is intact to the L4, L5, S1 distributions. No obvious deficits.  No evidence of neurological-based weakness, spasticity, or contracture in the lower extremities.     Derm: Normal texture and turgor.  No erythema, ecchymosis, or cyanosis.  No open lesions.       Musculoskeletal:    Lower extremity muscle strength is normal.  Patient is ambulatory without an assistive device or brace .  No gross deformities.  A well  encapsulated mass is noted dorsum of lateral tarsometatarsal joint.  Appears to be multilobulated with 3 separate cells.  Appears fluid-filled..  No erythema, ecchymosis or edema.  No pain on palpation.         A:Ganglion cyst     P:  Discussed cause of this with patient.  Will wear shoes which don't press on this.  Discussed possible causes of this with patient.  Patient would like to have surgical removal.  Risks such as return of mass, numbness, infection, and pain afterwards discussed with patient.  Explained to sural nerve goes over this mass and numbness distal to this possible.  Same day surgery,  and H&P 1 week before surgery.  Patient would just like to have a local anesthesia.  States her  has passed away and she is the only .  Discussed we could do surgery under local not requiring anesthesia.  Discussed with patient she would have a dressing on her right foot and she would not be able to drive this foot afterwards.  She would have to have someone drive her home afterwards because of the dressing on the right foot.  Patient will call and set up surgical time in the future when she is able to have a .  Thank you for allowing me participate in the care of this patient.           Alfredo Mendoza, JAQUAN, FACFAS

## 2022-05-05 NOTE — LETTER
2022         RE: Ruth Castellon  [address], [place]. \[facility]"" [id number]        Dear Colleague,    Thank you for referring your patient, Ruth Castellon, to the [facility]. Please see a copy of my visit note below.    S:  Patient seen today and consult from Dejuan Merino and complains of mass on the right foot.  Point to the dorsum of the lateral tarsometatarsal joint.    Patient has had this for 2 years. Has a burning pain with this.  Aggravated by activity and tight shoes and relieved by rest.  Denies mass on contralateral foot.  Believes this is slowly getting bigger.  Denies numbness.  Denies erythema ecchymosis or weakness.  Has never smoked.  Has history of lipoma removal arm and surgery went well..  She has never smoked.   No family history of masses.    ROS:     A 10-point review of systems was performed and is positive for that noted in the HPI and as seen below.  All other areas are negative.      No Known Allergies    Current Outpatient Medications   Medication Sig Dispense Refill     levothyroxine (SYNTHROID/LEVOTHROID) 50 MCG tablet Take 1 tablet (50 mcg) by mouth daily 90 tablet 0     naproxen (NAPROSYN) 500 MG tablet Take 1 tablet (500 mg) by mouth 2 times daily (with meals) (Patient not taking: No sig reported) 60 tablet 11     vitamin D3 (CHOLECALCIFEROL) 2000 units (50 mcg) tablet Take 1 tablet (2,000 Units) by mouth daily (Patient not taking: No sig reported) 100 tablet 0       Patient Active Problem List   Diagnosis     Dysmenorrhea     Vitamin D deficiency     Enlarged thyroid gland     Gastritis, chronic     Pelvic pain in female     [facility]     Perineal insufficiency     CARDIOVASCULAR SCREENING; LDL GOAL LESS THAN 160     History of O-[id number]-VNHH     Health Care Home     Hypothyroidism, unspecified type     Iron deficiency anemia, unspecified iron deficiency anemia type     Lipoma of skin and subcutaneous tissue     Other neutropenia (H)     Primary  osteoarthritis of left knee     Complex tear of medial meniscus of left knee as current injury       Past Medical History:   Diagnosis Date     Enlarged thyroid gland     normal function     Primary osteoarthritis of left knee 2020       Past Surgical History:   Procedure Laterality Date      SECTION [facility]      [facility] REMOVAL OF OVARY/TUBE(S)      left sided. postpartum ? cyst ? ovarian vein thrombosis     SOFT TISSUE SURGERY      Excision of RUE Lipoma       Family History   Problem Relation Age of Onset     Glaucoma No family hx of      Macular Degeneration No family hx of        Social History     Tobacco Use     Smoking status: Never Smoker     Smokeless tobacco: Never Used     Tobacco comment: Nonsmoking household   Substance Use Topics     Alcohol use: No           EXAM:    Vitals: There were no vitals taken for this visit.  BMI: There is no height or weight on file to calculate BMI.  Height: Data Unavailable    Constitutional/ general:  Pt is in no apparent distress, appears well-nourished.  Cooperative with history and physical exam.     Psych:  The patient answered questions appropriately.  Normal affect.  Seems to have reasonable expectations, in terms of treatment.     Eyes:  Visual scanning/ tracking without deficit.     Ears:  Response to auditory stimuli is normal.  No hearing aid devices.  Auricles in proper alignment.     Lymphatic:  Popliteal lymph nodes not enlarged.     Lungs:  Non labored breathing, non labored speech. No cough.  No audible wheezing. Even, quiet breathing.       Vascular:  Pedal pulses are palpable bilaterally for both the DP and PT arteries.  CFT < 3 sec.  No edema.  Pedal hair growth noted.    Neuro:  Alert and oriented x 3. Coordinated gait.  Light touch sensation is intact to the L4, L5, S1 distributions. No obvious deficits.  No evidence of neurological-based weakness, spasticity, or contracture in the lower extremities.     Derm: Normal  texture and turgor.  No erythema, ecchymosis, or cyanosis.  No open lesions.       Musculoskeletal:    Lower extremity muscle strength is normal.  Patient is ambulatory without an assistive device or brace .  No gross deformities.  A well encapsulated mass is noted dorsum of lateral tarsometatarsal joint.  Appears to be multilobulated with 3 separate cells.  Appears fluid-filled..  No erythema, ecchymosis or edema.  No pain on palpation.         A:Ganglion cyst     P:  Discussed cause of this with patient.  Will wear shoes which don't press on this.  Discussed possible causes of this with patient.  Patient would like to have surgical removal.  Risks such as return of mass, numbness, infection, and pain afterwards discussed with patient.  Explained to sural nerve goes over this mass and numbness distal to this possible.  Same day surgery,  and H&P 1 week before surgery.  Patient would just like to have a local anesthesia.  States her  has passed away and she is the only .  Discussed we could do surgery under local not requiring anesthesia.  Discussed with patient she would have a dressing on her right foot and she would not be able to drive this foot afterwards.  She would have to have someone drive her home afterwards because of the dressing on the right foot.  Patient will call and set up surgical time in the future when she is able to have a .  Thank you for allowing me participate in the care of this patient.           Alfredo Mendoza DPM, FACFAS            Again, thank you for allowing me to participate in the care of your patient.        Sincerely,        Alfredo Mendoza DPM

## 2022-08-01 DIAGNOSIS — E03.9 HYPOTHYROIDISM, UNSPECIFIED TYPE: ICD-10-CM

## 2022-08-02 RX ORDER — LEVOTHYROXINE SODIUM 50 UG/1
50 TABLET ORAL DAILY
Qty: 90 TABLET | Refills: 0 | Status: SHIPPED | OUTPATIENT
Start: 2022-08-02 | End: 2022-11-08

## 2022-08-02 NOTE — TELEPHONE ENCOUNTER
Prescription approved per [facility] Refill Protocol.    Nyla Fatima RN   [facility]

## 2023-01-08 ENCOUNTER — HEALTH MAINTENANCE LETTER (OUTPATIENT)
Age: 53
End: 2023-01-08

## 2023-02-03 ENCOUNTER — TELEPHONE (OUTPATIENT)
Dept: FAMILY MEDICINE | Facility: CLINIC | Age: 53
End: 2023-02-03

## 2023-02-03 NOTE — TELEPHONE ENCOUNTER
Patient Quality Outreach    Patient is due for the following:   Colon Cancer Screening  Breast Cancer Screening - Mammogram  Physical Preventive Adult Physical      Topic Date Due     Hepatitis B Vaccine (1 of 3 - 3-dose series) Never done     Pneumococcal Vaccine (1 - PCV) Never done     Zoster (Shingles) Vaccine (1 of 2) Never done     Diptheria Tetanus Pertussis (DTAP/TDAP/TD) Vaccine (4 - Td or Tdap) 12/25/2021     COVID-19 Vaccine (2 - Pfizer risk series) 02/26/2022     Flu Vaccine (1) Never done       Next Steps:   Schedule a Adult Preventative    Type of outreach:    Sent [employer] message.      Questions for provider review:    None     Dora Vanegas CMA  Chart routed to Care Team.

## 2023-02-07 DIAGNOSIS — E03.9 HYPOTHYROIDISM, UNSPECIFIED TYPE: ICD-10-CM

## 2023-02-07 NOTE — TELEPHONE ENCOUNTER
Patient called regarding refill for levothyroxine (SYNTHROID/LEVOTHROID) 50 MCG tablet.    Patient is scheduled to see Joleen Macdonald on Tuesday, March 7th at 3:30 p.m., but needs refill now.  Took last pill this morning.    If any questions or problems, patient can be reached at [phone].    Magdalena Lang,

## 2023-02-08 RX ORDER — LEVOTHYROXINE SODIUM 50 UG/1
TABLET ORAL
Qty: 90 TABLET | Refills: 0 | Status: SHIPPED | OUTPATIENT
Start: 2023-02-08 | End: 2023-03-07

## 2023-03-06 NOTE — TELEPHONE ENCOUNTER
Patient Quality Outreach    Patient is due for the following:   Colon Cancer Screening  Breast Cancer Screening - Mammogram  Physical     Next Steps:   Patient has upcoming appointment, these items will be addressed at that time.    Type of outreach:    Sent [employer] message.    Next Steps:  Reach out within 90 days via Letter.    Max number of attempts reached: Yes. Will try again in 90 days if patient still on fail list.    Questions for provider review:    None     Dora Vanegas CMA  Chart routed to Care Team.

## 2023-03-07 ENCOUNTER — OFFICE VISIT (OUTPATIENT)
Dept: FAMILY MEDICINE | Facility: CLINIC | Age: 53
End: 2023-03-07
Payer: COMMERCIAL

## 2023-03-07 VITALS
WEIGHT: 151 LBS | OXYGEN SATURATION: 99 % | HEIGHT: 65 IN | DIASTOLIC BLOOD PRESSURE: 64 MMHG | SYSTOLIC BLOOD PRESSURE: 99 MMHG | HEART RATE: 68 BPM | RESPIRATION RATE: 12 BRPM | BODY MASS INDEX: 25.16 KG/M2 | TEMPERATURE: 98.4 F

## 2023-03-07 DIAGNOSIS — Z12.31 VISIT FOR SCREENING MAMMOGRAM: ICD-10-CM

## 2023-03-07 DIAGNOSIS — Z00.00 ROUTINE GENERAL MEDICAL EXAMINATION AT A HEALTH CARE FACILITY: Primary | ICD-10-CM

## 2023-03-07 DIAGNOSIS — Z12.11 SCREEN FOR COLON CANCER: ICD-10-CM

## 2023-03-07 DIAGNOSIS — E03.9 HYPOTHYROIDISM, UNSPECIFIED TYPE: ICD-10-CM

## 2023-03-07 DIAGNOSIS — E55.9 VITAMIN D DEFICIENCY: ICD-10-CM

## 2023-03-07 DIAGNOSIS — Z13.220 SCREENING FOR HYPERLIPIDEMIA: ICD-10-CM

## 2023-03-07 PROCEDURE — 80061 LIPID PANEL: CPT | Performed by: PHYSICIAN ASSISTANT

## 2023-03-07 PROCEDURE — 36415 COLL VENOUS BLD VENIPUNCTURE: CPT | Performed by: PHYSICIAN ASSISTANT

## 2023-03-07 PROCEDURE — 82306 VITAMIN D 25 HYDROXY: CPT | Performed by: PHYSICIAN ASSISTANT

## 2023-03-07 PROCEDURE — 99396 PREV VISIT EST AGE 40-64: CPT | Performed by: PHYSICIAN ASSISTANT

## 2023-03-07 RX ORDER — LEVOTHYROXINE SODIUM 50 UG/1
50 TABLET ORAL DAILY
Qty: 90 TABLET | Refills: 3 | Status: SHIPPED | OUTPATIENT
Start: 2023-03-07 | End: 2024-02-13

## 2023-03-07 RX ORDER — CHOLECALCIFEROL (VITAMIN D3) 50 MCG
50 TABLET ORAL DAILY
Qty: 100 TABLET | Refills: 0 | Status: SHIPPED | OUTPATIENT
Start: 2023-03-07 | End: 2023-11-14

## 2023-03-07 ASSESSMENT — ENCOUNTER SYMPTOMS
DIARRHEA: 0
SORE THROAT: 0
CHILLS: 0
HEMATOCHEZIA: 0
JOINT SWELLING: 0
NAUSEA: 0
ABDOMINAL PAIN: 0
CONSTIPATION: 0
ARTHRALGIAS: 0
SHORTNESS OF BREATH: 0
MYALGIAS: 0
DIZZINESS: 0
FEVER: 0
HEMATURIA: 0
WEAKNESS: 0
NERVOUS/ANXIOUS: 0
PALPITATIONS: 0
EYE PAIN: 0
HEADACHES: 0
PARESTHESIAS: 0
DYSURIA: 0
HEARTBURN: 0
BREAST MASS: 0
COUGH: 0
FREQUENCY: 0

## 2023-03-07 NOTE — PROGRESS NOTES
{PROVIDER CHARTING PREFERENCE:059500}    Subjective   Ruth is a 52 year old{ACCOMPANIED BY STATEMENT (Optional):860572}, presenting for the following health issues:  Physical      Healthy Habits:     Getting at least 3 servings of Calcium per day:  Yes    Bi-annual eye exam:  NO    Dental care twice a year:  NO    Sleep apnea or symptoms of sleep apnea:  None    Diet:  Regular (no restrictions)    Frequency of exercise:  2-3 days/week    Duration of exercise:  15-30 minutes    Taking medications regularly:  Yes    Medication side effects:  None    PHQ-2 Total Score: 0    Additional concerns today:  No       {SUPERLIST (Optional):164569}  {additonal problems for provider to add (Optional):556832}    Review of Systems   Constitutional: Negative for chills and fever.   HENT: Negative for congestion, ear pain, hearing loss and sore throat.    Eyes: Negative for pain and visual disturbance.   Respiratory: Negative for cough and shortness of breath.    Cardiovascular: Negative for chest pain, palpitations and peripheral edema.   Gastrointestinal: Negative for abdominal pain, constipation, diarrhea, heartburn, hematochezia and nausea.   Breasts:  Negative for tenderness, breast mass and discharge.   Genitourinary: Negative for dysuria, frequency, genital sores, hematuria, pelvic pain, urgency, vaginal bleeding and vaginal discharge.   Musculoskeletal: Negative for arthralgias, joint swelling and myalgias.   Skin: Negative for rash.   Neurological: Negative for dizziness, weakness, headaches and paresthesias.   Psychiatric/Behavioral: Negative for mood changes. The patient is not nervous/anxious.       {ROS COMP (Optional):614391}      Objective    There were no vitals taken for this visit.  There is no height or weight on file to calculate BMI.  Physical Exam   {Exam List (Optional):878840}    {Diagnostic Test Results (Optional):896384}    {AMBULATORY ATTESTATION (Optional):362115}

## 2023-03-07 NOTE — PROGRESS NOTES
SUBJECTIVE:   CC: Ruth is an 52 year old who presents for preventive health visit.     Patient has been advised of split billing requirements and indicates understanding: Yes  Healthy Habits:     Getting at least 3 servings of Calcium per day:  Yes    Bi-annual eye exam:  NO    Dental care twice a year:  NO    Sleep apnea or symptoms of sleep apnea:  None    Diet:  Regular (no restrictions)    Frequency of exercise:  2-3 days/week    Duration of exercise:  15-30 minutes    Taking medications regularly:  Yes    Medication side effects:  None    PHQ-2 Total Score: 0    Additional concerns today:  No          Today's PHQ-2 Score:   PHQ-2 ( 1999 Pfizer) 3/7/2023   Q1: Little interest or pleasure in doing things 0   Q2: Feeling down, depressed or hopeless 0   PHQ-2 Score 0   PHQ-2 Total Score (12-17 Years)- Positive if 3 or more points; Administer PHQ-A if positive -   Q1: Little interest or pleasure in doing things Not at all   Q2: Feeling down, depressed or hopeless Not at all   PHQ-2 Score 0       Have you ever done Advance Care Planning? (For example, a Health Directive, POLST, or a discussion with a medical provider or your loved ones about your wishes): No, advance care planning information given to patient to review.  Patient declined advance care planning discussion at this time.    Social History     Tobacco Use     Smoking status: Never     Smokeless tobacco: Never     Tobacco comments:     Nonsmoking household   Substance Use Topics     Alcohol use: No         Alcohol Use 3/7/2023   Prescreen: >3 drinks/day or >7 drinks/week? No       Reviewed orders with patient.  Reviewed health maintenance and updated orders accordingly - Yes  Labs reviewed in EPIC    Breast Cancer Screening:    Breast CA Risk Assessment (FHS-7) 3/7/2023   Do you have a family history of breast, colon, or ovarian cancer? No / Unknown         Mammogram Screening: Recommended annual mammography  Pertinent mammograms are reviewed under the  "imaging tab.    History of abnormal Pap smear: NO - age 30-65 PAP every 5 years with negative HPV co-testing recommended  PAP / HPV Latest Ref Rng & Units 12/20/2019 2/20/2015 1/31/2012   PAP (Historical) - NIL NIL NIL   HPV16 NEG:Negative Negative - -   HPV18 NEG:Negative Negative - -   HRHPV NEG:Negative Negative - -     Reviewed and updated as needed this visit by clinical staff   Tobacco  Allergies  Meds              Reviewed and updated as needed this visit by Provider    Allergies  Meds                 Review of Systems   Constitutional: Negative for chills and fever.   HENT: Negative for congestion, ear pain, hearing loss and sore throat.    Eyes: Negative for pain and visual disturbance.   Respiratory: Negative for cough and shortness of breath.    Cardiovascular: Negative for chest pain, palpitations and peripheral edema.   Gastrointestinal: Negative for abdominal pain, constipation, diarrhea, heartburn, hematochezia and nausea.   Breasts:  Negative for tenderness, breast mass and discharge.   Genitourinary: Negative for dysuria, frequency, genital sores, hematuria, pelvic pain, urgency, vaginal bleeding and vaginal discharge.   Musculoskeletal: Negative for arthralgias, joint swelling and myalgias.   Skin: Negative for rash.   Neurological: Negative for dizziness, weakness, headaches and paresthesias.   Psychiatric/Behavioral: Negative for mood changes. The patient is not nervous/anxious.           OBJECTIVE:   BP 99/64 (BP Location: Left arm, Patient Position: Sitting, Cuff Size: Adult Regular)   Pulse 68   Temp 98.4  F (36.9  C) (Oral)   Resp 12   Ht 1.651 m (5' 5\")   Wt 68.5 kg (151 lb)   LMP 08/11/2016   SpO2 99%   BMI 25.13 kg/m    Physical Exam  Constitutional:       General: She is not in acute distress.     Appearance: She is well-developed.   HENT:      Right Ear: Tympanic membrane, ear canal and external ear normal.      Left Ear: Tympanic membrane, ear canal and external ear normal. " "     Mouth/Throat:      Pharynx: No oropharyngeal exudate.   Eyes:      Conjunctiva/sclera: Conjunctivae normal.   Neck:      Thyroid: No thyromegaly.   Cardiovascular:      Rate and Rhythm: Normal rate and regular rhythm.      Heart sounds: Normal heart sounds.   Pulmonary:      Effort: Pulmonary effort is normal.      Breath sounds: Normal breath sounds.   Abdominal:      General: Bowel sounds are normal.      Palpations: Abdomen is soft.      Tenderness: There is no abdominal tenderness.   Lymphadenopathy:      Cervical: No cervical adenopathy.   Skin:     General: Skin is warm and dry.   Neurological:      Mental Status: She is alert and oriented to person, place, and time.   Psychiatric:         Behavior: Behavior normal.           Diagnostic Test Results:  Labs reviewed in Epic    ASSESSMENT/PLAN:   (Z00.00) Routine general medical examination at a health care facility  (primary encounter diagnosis)  Comment:   Plan: overall healthy     (Z12.31) Visit for screening mammogram  Comment:   Plan: MA SCREENING DIGITAL BILAT - Future  (s+30)            (Z12.11) Screen for colon cancer  Comment:   Plan: she declines at this time.      (Z13.220) Screening for hyperlipidemia  Comment:   Plan: Lipid panel reflex to direct LDL Non-fasting        Follow up as needed    (E03.9) Hypothyroidism, unspecified type  Comment:   Plan: levothyroxine (SYNTHROID/LEVOTHROID) 50 MCG         tablet        refilled    (E55.9) Vitamin D deficiency  Comment:   Plan: vitamin D3 (CHOLECALCIFEROL) 50 mcg (2000         units) tablet, Vitamin D deficiency screening        Follow up as needed            COUNSELING:  Reviewed preventive health counseling, as reflected in patient instructions      BMI:   Estimated body mass index is 25.13 kg/m  as calculated from the following:    Height as of this encounter: 1.651 m (5' 5\").    Weight as of this encounter: 68.5 kg (151 lb).         She reports that she has never smoked. She has never used " smokeless tobacco.          Joleen Macdonald PA-C  [facility]

## 2023-03-07 NOTE — PATIENT INSTRUCTIONS
Schedule mammogram   Preventive Health Recommendations  Female Ages 50 - 64    Yearly exam: See your health care provider every year in order to  Review health changes.   Discuss preventive care.    Review your medicines if your doctor has prescribed any.    Get a Pap test every three years (unless you have an abnormal result and your provider advises testing more often).  If you get Pap tests with HPV test, you only need to test every 5 years, unless you have an abnormal result.   You do not need a Pap test if your uterus was removed (hysterectomy) and you have not had cancer.  You should be tested each year for STDs (sexually transmitted diseases) if you're at risk.   Have a mammogram every 1 to 2 years.  Have a colonoscopy at age 50, or have a yearly FIT test (stool test). These exams screen for colon cancer.    Have a cholesterol test every 5 years, or more often if advised.  Have a diabetes test (fasting glucose) every three years. If you are at risk for diabetes, you should have this test more often.   If you are at risk for osteoporosis (brittle bone disease), think about having a bone density scan (DEXA).    Shots: Get a flu shot each year. Get a tetanus shot every 10 years.    Nutrition:   Eat at least 5 servings of fruits and vegetables each day.  Eat whole-grain bread, whole-wheat pasta and brown rice instead of white grains and rice.  Get adequate Calcium and Vitamin D.     Lifestyle  Exercise at least 150 minutes a week (30 minutes a day, 5 days a week). This will help you control your weight and prevent disease.  Limit alcohol to one drink per day.  No smoking.   Wear sunscreen to prevent skin cancer.   See your dentist every six months for an exam and cleaning.  See your eye doctor every 1 to 2 years.

## 2023-03-08 LAB
CHOLEST SERPL-MCNC: 219 MG/DL
DEPRECATED CALCIDIOL+CALCIFEROL SERPL-MC: 10 UG/L (ref 20–75)
FASTING STATUS PATIENT QL REPORTED: NO
HDLC SERPL-MCNC: 66 MG/DL
LDLC SERPL CALC-MCNC: 135 MG/DL
NONHDLC SERPL-MCNC: 153 MG/DL
TRIGL SERPL-MCNC: 92 MG/DL

## 2023-03-17 NOTE — PATIENT INSTRUCTIONS
Left knee has early osteoarthritis and medial meniscus tear.  We will try arthritis pill.  Naproxen 500 mg twice daily.    Use at lest 2-3 weeks to see how it does.  If this does not work Return to clinic to discuss options of a different pill,  injection or surgery.   Subjective   Patient ID: Duyen is a 7 Ji Goss,  DO

## 2023-11-14 DIAGNOSIS — E55.9 VITAMIN D DEFICIENCY: ICD-10-CM

## 2023-11-14 RX ORDER — CHOLECALCIFEROL (VITAMIN D3) 50 MCG
50 TABLET ORAL DAILY
Qty: 100 TABLET | Refills: 0 | Status: SHIPPED | OUTPATIENT
Start: 2023-11-14 | End: 2024-02-13

## 2024-02-06 ENCOUNTER — PATIENT OUTREACH (OUTPATIENT)
Dept: CARE COORDINATION | Facility: CLINIC | Age: 54
End: 2024-02-06
Payer: COMMERCIAL

## 2024-02-13 ENCOUNTER — OFFICE VISIT (OUTPATIENT)
Dept: FAMILY MEDICINE | Facility: CLINIC | Age: 54
End: 2024-02-13
Payer: COMMERCIAL

## 2024-02-13 VITALS
TEMPERATURE: 98 F | HEART RATE: 67 BPM | DIASTOLIC BLOOD PRESSURE: 61 MMHG | RESPIRATION RATE: 19 BRPM | WEIGHT: 157 LBS | BODY MASS INDEX: 26.16 KG/M2 | SYSTOLIC BLOOD PRESSURE: 101 MMHG | OXYGEN SATURATION: 100 % | HEIGHT: 65 IN

## 2024-02-13 DIAGNOSIS — E03.9 HYPOTHYROIDISM, UNSPECIFIED TYPE: ICD-10-CM

## 2024-02-13 DIAGNOSIS — D70.8 OTHER NEUTROPENIA (H): ICD-10-CM

## 2024-02-13 DIAGNOSIS — E04.9 ENLARGED THYROID GLAND: Primary | ICD-10-CM

## 2024-02-13 DIAGNOSIS — E55.9 VITAMIN D DEFICIENCY: ICD-10-CM

## 2024-02-13 LAB
BASOPHILS # BLD AUTO: 0 10E3/UL (ref 0–0.2)
BASOPHILS NFR BLD AUTO: 1 %
EOSINOPHIL # BLD AUTO: 0 10E3/UL (ref 0–0.7)
EOSINOPHIL NFR BLD AUTO: 1 %
ERYTHROCYTE [DISTWIDTH] IN BLOOD BY AUTOMATED COUNT: 12.4 % (ref 10–15)
FASTING STATUS PATIENT QL REPORTED: NO
GLUCOSE SERPL-MCNC: 119 MG/DL (ref 70–99)
HCT VFR BLD AUTO: 40.7 % (ref 35–47)
HGB BLD-MCNC: 13.3 G/DL (ref 11.7–15.7)
IMM GRANULOCYTES # BLD: 0 10E3/UL
IMM GRANULOCYTES NFR BLD: 0 %
LYMPHOCYTES # BLD AUTO: 1.6 10E3/UL (ref 0.8–5.3)
LYMPHOCYTES NFR BLD AUTO: 46 %
MCH RBC QN AUTO: 29.2 PG (ref 26.5–33)
MCHC RBC AUTO-ENTMCNC: 32.7 G/DL (ref 31.5–36.5)
MCV RBC AUTO: 90 FL (ref 78–100)
MONOCYTES # BLD AUTO: 0.4 10E3/UL (ref 0–1.3)
MONOCYTES NFR BLD AUTO: 11 %
NEUTROPHILS # BLD AUTO: 1.4 10E3/UL (ref 1.6–8.3)
NEUTROPHILS NFR BLD AUTO: 41 %
PLATELET # BLD AUTO: 191 10E3/UL (ref 150–450)
RBC # BLD AUTO: 4.55 10E6/UL (ref 3.8–5.2)
TSH SERPL DL<=0.005 MIU/L-ACNC: 1.23 UIU/ML (ref 0.3–4.2)
WBC # BLD AUTO: 3.4 10E3/UL (ref 4–11)

## 2024-02-13 PROCEDURE — 84443 ASSAY THYROID STIM HORMONE: CPT | Performed by: PHYSICIAN ASSISTANT

## 2024-02-13 PROCEDURE — 99214 OFFICE O/P EST MOD 30 MIN: CPT | Mod: 25 | Performed by: PHYSICIAN ASSISTANT

## 2024-02-13 PROCEDURE — 82947 ASSAY GLUCOSE BLOOD QUANT: CPT | Performed by: PHYSICIAN ASSISTANT

## 2024-02-13 PROCEDURE — 85025 COMPLETE CBC W/AUTO DIFF WBC: CPT | Performed by: PHYSICIAN ASSISTANT

## 2024-02-13 PROCEDURE — 36415 COLL VENOUS BLD VENIPUNCTURE: CPT | Performed by: PHYSICIAN ASSISTANT

## 2024-02-13 PROCEDURE — 90715 TDAP VACCINE 7 YRS/> IM: CPT | Performed by: PHYSICIAN ASSISTANT

## 2024-02-13 PROCEDURE — 90750 HZV VACC RECOMBINANT IM: CPT | Performed by: PHYSICIAN ASSISTANT

## 2024-02-13 PROCEDURE — 90472 IMMUNIZATION ADMIN EACH ADD: CPT | Performed by: PHYSICIAN ASSISTANT

## 2024-02-13 PROCEDURE — 90471 IMMUNIZATION ADMIN: CPT | Performed by: PHYSICIAN ASSISTANT

## 2024-02-13 RX ORDER — CHOLECALCIFEROL (VITAMIN D3) 50 MCG
50 TABLET ORAL DAILY
Qty: 100 TABLET | Refills: 3 | Status: SHIPPED | OUTPATIENT
Start: 2024-02-13

## 2024-02-13 RX ORDER — LEVOTHYROXINE SODIUM 50 UG/1
50 TABLET ORAL DAILY
Qty: 90 TABLET | Refills: 3 | Status: SHIPPED | OUTPATIENT
Start: 2024-02-13

## 2024-02-13 NOTE — PROGRESS NOTES
"  Assessment & Plan     Hypothyroidism, unspecified type  Due for labs.  Follow up as needed.  Last ultrasound did not indicated any further workup.  Had one bx and it was benign.    - Glucose; Future  - TSH WITH FREE T4 REFLEX; Future  - levothyroxine (SYNTHROID/LEVOTHROID) 50 MCG tablet; Take 1 tablet (50 mcg) by mouth daily  - Glucose  - TSH WITH FREE T4 REFLEX    Enlarged thyroid gland  As above    Vitamin D deficiency  refilled  - Vitamin D3 50 mcg (2000 units) tablet; Take 1 tablet (50 mcg) by mouth daily    Other neutropenia (H24)  Follow up as needed  - CBC with platelets and differential; Future  - CBC with platelets and differential            BMI  Estimated body mass index is 26.13 kg/m  as calculated from the following:    Height as of this encounter: 1.651 m (5' 5\").    Weight as of this encounter: 71.2 kg (157 lb).   Weight management plan: not addressed           Subjective   Ruth is a 53 year old, presenting for the following health issues:  Follow Up (Lab work )      2/13/2024     8:52 AM   Additional Questions   Roomed by Snadie   Accompanied by self     History of Present Illness       Reason for visit:  Flow up    She eats 2-3 servings of fruits and vegetables daily.She consumes 2 sweetened beverage(s) daily.She exercises with enough effort to increase her heart rate 10 to 19 minutes per day.  She exercises with enough effort to increase her heart rate 4 days per week.   She is taking medications regularly.           Lab work -check thyroid   No hair or nail changes.  No palpitations.  No constipation or weight changes.                    Objective    /61   Pulse 67   Temp 98  F (36.7  C) (Oral)   Resp 19   Ht 1.651 m (5' 5\")   Wt 71.2 kg (157 lb)   LMP 08/11/2016   SpO2 100%   BMI 26.13 kg/m    Body mass index is 26.13 kg/m .  Physical Exam  Constitutional:       General: She is not in acute distress.  Neck:      Thyroid: Thyroid mass and thyromegaly present.   Cardiovascular:      " Rate and Rhythm: Normal rate and regular rhythm.   Pulmonary:      Effort: Pulmonary effort is normal.      Breath sounds: Normal breath sounds.   Lymphadenopathy:      Cervical: No cervical adenopathy.   Neurological:      Mental Status: She is alert.   Psychiatric:         Mood and Affect: Mood normal.                    Signed Electronically by: Joleen Macdonald PA-C

## 2024-02-20 ENCOUNTER — PATIENT OUTREACH (OUTPATIENT)
Dept: CARE COORDINATION | Facility: CLINIC | Age: 54
End: 2024-02-20
Payer: COMMERCIAL

## 2024-03-19 ENCOUNTER — OFFICE VISIT (OUTPATIENT)
Dept: FAMILY MEDICINE | Facility: CLINIC | Age: 54
End: 2024-03-19
Payer: COMMERCIAL

## 2024-03-19 VITALS
SYSTOLIC BLOOD PRESSURE: 116 MMHG | DIASTOLIC BLOOD PRESSURE: 75 MMHG | WEIGHT: 153.4 LBS | HEART RATE: 77 BPM | BODY MASS INDEX: 25.56 KG/M2 | RESPIRATION RATE: 14 BRPM | OXYGEN SATURATION: 99 % | HEIGHT: 65 IN | TEMPERATURE: 97.8 F

## 2024-03-19 DIAGNOSIS — R05.1 ACUTE COUGH: Primary | ICD-10-CM

## 2024-03-19 PROCEDURE — 99214 OFFICE O/P EST MOD 30 MIN: CPT | Performed by: PHYSICIAN ASSISTANT

## 2024-03-19 RX ORDER — BENZONATATE 100 MG/1
100 CAPSULE ORAL 3 TIMES DAILY PRN
Qty: 30 CAPSULE | Refills: 1 | Status: SHIPPED | OUTPATIENT
Start: 2024-03-19

## 2024-03-19 ASSESSMENT — ENCOUNTER SYMPTOMS: COUGH: 1

## 2024-03-19 NOTE — PROGRESS NOTES
Assessment & Plan     Acute cough  Normal exam today. Can try benzonatate for symptoms. Return if not improving. I discussed with the patient risks and benefits of the new medication prescribed including potential side effects.  The patient had opportunity to ask questions and is comfortable with and interested in medications as prescribed.   - benzonatate (TESSALON) 100 MG capsule; Take 1 capsule (100 mg) by mouth 3 times daily as needed for cough                  Subjective   Ruth is a 53 year old, presenting for the following health issues:  Cough (X 5 days. Cough is improving slightly )        3/19/2024     3:05 PM   Additional Questions   Roomed by An V.         3/19/2024     3:05 PM   Patient Reported Additional Medications   Patient reports taking the following new medications none     Cough    History of Present Illness       Reason for visit:  Cough  Symptom onset:  3-7 days ago  Symptoms include:  Dry cough  Symptom intensity:  Mild  Symptom progression:  Improving  Had these symptoms before:  No  What makes it worse:  When talking and at night  What makes it better:  Fluids    She eats 2-3 servings of fruits and vegetables daily.She consumes 1 sweetened beverage(s) daily.She exercises with enough effort to increase her heart rate 20 to 29 minutes per day.  She exercises with enough effort to increase her heart rate 7 days per week.   She is taking medications regularly.         Acute Illness  Acute illness concerns: dry cough  Onset/Duration: 5 days  Symptoms:  Fever: No  Chills/Sweats: No  Headache (location?): No  Sinus Pressure: No  Conjunctivitis:  No  Ear Pain: no  Rhinorrhea: No  Congestion: No  Sore Throat: No  Cough: YES-dry cough  Wheeze: No  Decreased Appetite: No  Nausea: No  Vomiting: No  Diarrhea: No  Dysuria/Freq.: No  Dysuria or Hematuria: No  Fatigue/Achiness: No  Sick/Strep Exposure: No  Therapies tried and outcome: fluids     Ramadan started - patient fasting. Thinks this contributed  "to cold symptoms. No fever, no chills, no SOB, no congestion          Review of Systems  Constitutional, HEENT, cardiovascular, pulmonary, gi and gu systems are negative, except as otherwise noted.      Objective    /75   Pulse 77   Temp 97.8  F (36.6  C) (Oral)   Resp 14   Ht 1.644 m (5' 4.72\")   Wt 69.6 kg (153 lb 6.4 oz)   LMP 08/11/2016   SpO2 99%   BMI 25.75 kg/m    Body mass index is 25.75 kg/m .  Physical Exam   GENERAL: alert and no distress  EYES: Eyes grossly normal to inspection, PERRL and conjunctivae and sclerae normal  HENT: ear canals and TM's normal, nose and mouth without ulcers or lesions  NECK: no adenopathy, no asymmetry, masses, or scars  RESP: lungs clear to auscultation - no rales, rhonchi or wheezes  CV: regular rate and rhythm, normal S1 S2, no S3 or S4, no murmur, click or rub, no peripheral edema  MS: no gross musculoskeletal defects noted, no edema            Signed Electronically by: Kaylan Farr PA-C    "

## 2024-04-21 ENCOUNTER — HEALTH MAINTENANCE LETTER (OUTPATIENT)
Age: 54
End: 2024-04-21

## 2025-02-08 ENCOUNTER — HEALTH MAINTENANCE LETTER (OUTPATIENT)
Age: 55
End: 2025-02-08

## 2025-02-17 ENCOUNTER — NURSE TRIAGE (OUTPATIENT)
Dept: FAMILY MEDICINE | Facility: CLINIC | Age: 55
End: 2025-02-17

## 2025-02-17 ENCOUNTER — OFFICE VISIT (OUTPATIENT)
Dept: FAMILY MEDICINE | Facility: CLINIC | Age: 55
End: 2025-02-17

## 2025-02-17 VITALS
WEIGHT: 157.38 LBS | BODY MASS INDEX: 26.22 KG/M2 | HEIGHT: 65 IN | OXYGEN SATURATION: 100 % | HEART RATE: 88 BPM | DIASTOLIC BLOOD PRESSURE: 81 MMHG | SYSTOLIC BLOOD PRESSURE: 122 MMHG | RESPIRATION RATE: 18 BRPM | TEMPERATURE: 97.6 F

## 2025-02-17 DIAGNOSIS — R05.1 ACUTE COUGH: ICD-10-CM

## 2025-02-17 DIAGNOSIS — J20.9 ACUTE BRONCHITIS WITH SYMPTOMS > 10 DAYS: Primary | ICD-10-CM

## 2025-02-17 PROCEDURE — 99213 OFFICE O/P EST LOW 20 MIN: CPT | Performed by: FAMILY MEDICINE

## 2025-02-17 RX ORDER — BENZONATATE 100 MG/1
100 CAPSULE ORAL 3 TIMES DAILY PRN
Qty: 30 CAPSULE | Refills: 1 | Status: SHIPPED | OUTPATIENT
Start: 2025-02-17

## 2025-02-17 RX ORDER — PREDNISONE 10 MG/1
TABLET ORAL
Qty: 18 TABLET | Refills: 0 | Status: SHIPPED | OUTPATIENT
Start: 2025-02-17

## 2025-02-17 RX ORDER — DOXYCYCLINE 100 MG/1
100 CAPSULE ORAL 2 TIMES DAILY
Qty: 28 CAPSULE | Refills: 0 | Status: SHIPPED | OUTPATIENT
Start: 2025-02-17 | End: 2025-03-03

## 2025-02-17 ASSESSMENT — ENCOUNTER SYMPTOMS: COUGH: 1

## 2025-02-17 ASSESSMENT — PAIN SCALES - GENERAL: PAINLEVEL_OUTOF10: NO PAIN (0)

## 2025-02-17 NOTE — PATIENT INSTRUCTIONS
Tapering dose prednisone    Take antibiotics doxycycline    Stay well hydrated    Benzonatate as needed for cough    Follow up as needed based on symptoms

## 2025-02-17 NOTE — PROGRESS NOTES
"  Assessment & Plan     (J20.9) Acute bronchitis with symptoms > 10 days  (primary encounter diagnosis)  Comment: tapering dose prednisone and antibiotics appropriate given duration of symptoms .   Plan: predniSONE (DELTASONE) 10 MG tablet,         doxycycline hyclate (VIBRAMYCIN) 100 MG capsule             (R05.1) Acute cough  Comment: can try this prn, has had in past   Plan: benzonatate (TESSALON) 100 MG capsule             Stay well hydrated    Follow up prn           BMI  Estimated body mass index is 26.43 kg/m  as calculated from the following:    Height as of this encounter: 1.643 m (5' 4.7\").    Weight as of this encounter: 71.4 kg (157 lb 6 oz).             Dimitrios Miranda is a 54 year old, presenting for the following health issues:  Cough (For the past 4 weeks, Has been using nyquil and dayquil but is not getting relief)        2/17/2025     8:48 AM   Additional Questions   Roomed by Janki Martinez     History of Present Illness       Reason for visit:  Couch   She is taking medications regularly.      Night bad cough    Yellow phlegm in am , white later in day    No fever/ chill    No acid taste / symptoms    No asthma     No environmental allergies    No chest pain    Maybe exposed from her kids, via school    Chest congestion  more than  head            Objective    /81 (BP Location: Right arm, Patient Position: Chair, Cuff Size: Adult Regular)   Pulse 88   Temp 97.6  F (36.4  C) (Temporal)   Resp 18   Ht 1.643 m (5' 4.7\")   Wt 71.4 kg (157 lb 6 oz)   LMP 08/11/2016   SpO2 100%   BMI 26.43 kg/m    Body mass index is 26.43 kg/m .  Physical Exam  Constitutional:       Appearance: Normal appearance.   HENT:      Head: Normocephalic and atraumatic.      Right Ear: Tympanic membrane, ear canal and external ear normal.      Left Ear: Tympanic membrane, ear canal and external ear normal.      Nose: Nose normal.      Mouth/Throat:      Mouth: Mucous membranes are moist.      Pharynx: Oropharynx " is clear.   Eyes:      Conjunctiva/sclera: Conjunctivae normal.   Cardiovascular:      Rate and Rhythm: Normal rate and regular rhythm.      Heart sounds: Normal heart sounds.   Pulmonary:      Effort: Pulmonary effort is normal. No respiratory distress.      Breath sounds: Wheezing (rare wheeze posteriorly) present.   Chest:      Chest wall: No tenderness.   Neurological:      Mental Status: She is alert.         No edema  Radials symmetric              Signed Electronically by: Jared Velázquez MD

## 2025-02-17 NOTE — TELEPHONE ENCOUNTER
Pt calling regarding concerns for her cough that has lasted for over 4 weeks.     When the cough first presented itself she was sick with a fever, runny nose and congestion, all of her other symptoms have subsided except for the cough that will not go away. Pt stated that it is worse at night. Pt has tried OTC Nyquil and Dayquil, honey and julia root. Pt notes that none of these OTC remedies have helped.     RN advised that pt be seen within the next three days. Pt agreeable to recommendations. RN assisted with scheduling and scheduled pt with Dr. Velázquez at 8:40 today 2/17.     Reason for Disposition   Cough has been present for > 3 weeks    Additional Information   Negative: Bluish (or gray) lips or face   Negative: SEVERE difficulty breathing (e.g., struggling for each breath, speaks in single words)   Negative: Rapid onset of cough and has hives   Negative: Coughing started suddenly after medicine, an allergic food or bee sting   Negative: Difficulty breathing after exposure to flames, smoke, or fumes   Negative: Sounds like a life-threatening emergency to the triager   Negative: Previous asthma attacks and this feels like asthma attack   Negative: Dry cough (non-productive; no sputum or minimal clear sputum) and within 14 days of COVID-19 Exposure   Negative: MODERATE difficulty breathing (e.g., speaks in phrases, SOB even at rest, pulse 100-120) and still present when not coughing   Negative: Chest pain present when not coughing   Negative: Passed out (e.g., fainted, lost consciousness, blacked out and was not responding)   Negative: Patient sounds very sick or weak to the triager   Negative: MILD difficulty breathing (e.g., minimal/no SOB at rest, SOB with walking, pulse <100) and still present when not coughing   Negative: Coughed up > 1 tablespoon (15 ml) blood (Exception: Blood-tinged sputum.)   Negative: Fever > 103 F (39.4 C)   Negative: Fever > 101 F (38.3 C) and over 60 years of age   Negative: Fever >  "100 F (37.8 C) and has diabetes mellitus or a weak immune system (e.g., HIV positive, cancer chemotherapy, organ transplant, splenectomy, chronic steroids)   Negative: Fever > 100 F (37.8 C) and bedridden (e.g., CVA, chronic illness, recovering from surgery)   Negative: Increasing ankle swelling   Negative: Wheezing is present   Negative: SEVERE coughing spells (e.g., whooping sound after coughing, vomiting after coughing)   Negative: Coughing up lazarus-colored (reddish-brown) or blood-tinged sputum   Negative: Fever present > 3 days (72 hours)   Negative: Fever returns after gone for over 24 hours and symptoms worse or not improved   Negative: Using nasal washes and pain medicine > 24 hours and sinus pain persists   Negative: Known COPD or other severe lung disease (i.e., bronchiectasis, cystic fibrosis, lung surgery) and symptoms getting worse (i.e., increased sputum purulence or amount, increased breathing difficulty)   Negative: Continuous (nonstop) coughing interferes with work or school and no improvement using cough treatment per Care Advice   Negative: Patient wants to be seen    Answer Assessment - Initial Assessment Questions  1. ONSET: \"When did the cough begin?\"       About 4 weeks  2. SEVERITY: \"How bad is the cough today?\"       Worse at night  3. SPUTUM: \"Describe the color of your sputum\" (e.g., none, dry cough; clear, white, yellow, green)      White sputum  4. HEMOPTYSIS: \"Are you coughing up any blood?\" If Yes, ask: \"How much?\" (e.g., flecks, streaks, tablespoons, etc.)      Denies  5. DIFFICULTY BREATHING: \"Are you having difficulty breathing?\" If Yes, ask: \"How bad is it?\" (e.g., mild, moderate, severe)       Denies   6. FEVER: \"Do you have a fever?\" If Yes, ask: \"What is your temperature, how was it measured, and when did it start?\"      denies  7. CARDIAC HISTORY: \"Do you have any history of heart disease?\" (e.g., heart attack, congestive heart failure)       denies  8. LUNG HISTORY: \"Do you " "have any history of lung disease?\"  (e.g., pulmonary embolus, asthma, emphysema)      denies  9. PE RISK FACTORS: \"Do you have a history of blood clots?\" (or: recent major surgery, recent prolonged travel, bedridden)      denies  10. OTHER SYMPTOMS: \"Do you have any other symptoms?\" (e.g., runny nose, wheezing, chest pain)        denies  11. PREGNANCY: \"Is there any chance you are pregnant?\" \"When was your last menstrual period?\"        denies  12. TRAVEL: \"Have you traveled out of the country in the last month?\" (e.g., travel history, exposures)        denies    Protocols used: Cough-A-[place]    Jazmine Ghotra RN    "

## 2025-02-19 DIAGNOSIS — E03.9 HYPOTHYROIDISM, UNSPECIFIED TYPE: ICD-10-CM

## 2025-02-20 RX ORDER — LEVOTHYROXINE SODIUM 50 UG/1
50 TABLET ORAL DAILY
Qty: 90 TABLET | Refills: 0 | Status: SHIPPED | OUTPATIENT
Start: 2025-02-20

## 2025-05-11 ENCOUNTER — HEALTH MAINTENANCE LETTER (OUTPATIENT)
Age: 55
End: 2025-05-11

## 2025-05-23 DIAGNOSIS — E03.9 HYPOTHYROIDISM, UNSPECIFIED TYPE: ICD-10-CM

## 2025-05-27 RX ORDER — LEVOTHYROXINE SODIUM 50 UG/1
50 TABLET ORAL DAILY
Qty: 90 TABLET | Refills: 0 | Status: SHIPPED | OUTPATIENT
Start: 2025-05-27

## 2025-06-04 ENCOUNTER — OFFICE VISIT (OUTPATIENT)
Dept: FAMILY MEDICINE | Facility: CLINIC | Age: 55
End: 2025-06-04
Payer: COMMERCIAL

## 2025-06-04 ENCOUNTER — ORDERS ONLY (AUTO-RELEASED) (OUTPATIENT)
Dept: FAMILY MEDICINE | Facility: CLINIC | Age: 55
End: 2025-06-04

## 2025-06-04 VITALS
WEIGHT: 159 LBS | TEMPERATURE: 97.9 F | BODY MASS INDEX: 26.49 KG/M2 | DIASTOLIC BLOOD PRESSURE: 69 MMHG | RESPIRATION RATE: 16 BRPM | OXYGEN SATURATION: 100 % | HEIGHT: 65 IN | SYSTOLIC BLOOD PRESSURE: 115 MMHG | HEART RATE: 60 BPM

## 2025-06-04 DIAGNOSIS — Z13.6 CARDIOVASCULAR SCREENING; LDL GOAL LESS THAN 160: ICD-10-CM

## 2025-06-04 DIAGNOSIS — E03.9 HYPOTHYROIDISM, UNSPECIFIED TYPE: Primary | ICD-10-CM

## 2025-06-04 DIAGNOSIS — R73.9 ELEVATED BLOOD SUGAR: ICD-10-CM

## 2025-06-04 DIAGNOSIS — Z12.11 SCREEN FOR COLON CANCER: ICD-10-CM

## 2025-06-04 DIAGNOSIS — E55.9 VITAMIN D DEFICIENCY: ICD-10-CM

## 2025-06-04 DIAGNOSIS — Z12.4 CERVICAL CANCER SCREENING: ICD-10-CM

## 2025-06-04 DIAGNOSIS — D50.9 IRON DEFICIENCY ANEMIA, UNSPECIFIED IRON DEFICIENCY ANEMIA TYPE: ICD-10-CM

## 2025-06-04 DIAGNOSIS — Z12.31 VISIT FOR SCREENING MAMMOGRAM: ICD-10-CM

## 2025-06-04 LAB
ANION GAP SERPL CALCULATED.3IONS-SCNC: 9 MMOL/L (ref 7–15)
BUN SERPL-MCNC: 19.8 MG/DL (ref 6–20)
CALCIUM SERPL-MCNC: 8.1 MG/DL (ref 8.8–10.4)
CHLORIDE SERPL-SCNC: 108 MMOL/L (ref 98–107)
CHOLEST SERPL-MCNC: 229 MG/DL
CREAT SERPL-MCNC: 0.9 MG/DL (ref 0.51–0.95)
EGFRCR SERPLBLD CKD-EPI 2021: 76 ML/MIN/1.73M2
FASTING STATUS PATIENT QL REPORTED: YES
FASTING STATUS PATIENT QL REPORTED: YES
FERRITIN SERPL-MCNC: 184 NG/ML (ref 11–328)
GLUCOSE SERPL-MCNC: 114 MG/DL (ref 70–99)
HCO3 SERPL-SCNC: 21 MMOL/L (ref 22–29)
HDLC SERPL-MCNC: 55 MG/DL
LDLC SERPL CALC-MCNC: 155 MG/DL
NONHDLC SERPL-MCNC: 174 MG/DL
POTASSIUM SERPL-SCNC: 4.4 MMOL/L (ref 3.4–5.3)
SODIUM SERPL-SCNC: 138 MMOL/L (ref 135–145)
TRIGL SERPL-MCNC: 97 MG/DL
TSH SERPL DL<=0.005 MIU/L-ACNC: 2.04 UIU/ML (ref 0.3–4.2)
VIT D+METAB SERPL-MCNC: 14 NG/ML (ref 20–50)

## 2025-06-04 PROCEDURE — 90750 HZV VACC RECOMBINANT IM: CPT | Performed by: PHYSICIAN ASSISTANT

## 2025-06-04 PROCEDURE — 84443 ASSAY THYROID STIM HORMONE: CPT | Performed by: PHYSICIAN ASSISTANT

## 2025-06-04 PROCEDURE — 36415 COLL VENOUS BLD VENIPUNCTURE: CPT | Performed by: PHYSICIAN ASSISTANT

## 2025-06-04 PROCEDURE — 82306 VITAMIN D 25 HYDROXY: CPT | Performed by: PHYSICIAN ASSISTANT

## 2025-06-04 PROCEDURE — G2211 COMPLEX E/M VISIT ADD ON: HCPCS | Performed by: PHYSICIAN ASSISTANT

## 2025-06-04 PROCEDURE — 80061 LIPID PANEL: CPT | Performed by: PHYSICIAN ASSISTANT

## 2025-06-04 PROCEDURE — 90471 IMMUNIZATION ADMIN: CPT | Performed by: PHYSICIAN ASSISTANT

## 2025-06-04 PROCEDURE — 3078F DIAST BP <80 MM HG: CPT | Performed by: PHYSICIAN ASSISTANT

## 2025-06-04 PROCEDURE — 99214 OFFICE O/P EST MOD 30 MIN: CPT | Mod: 25 | Performed by: PHYSICIAN ASSISTANT

## 2025-06-04 PROCEDURE — 90677 PCV20 VACCINE IM: CPT | Performed by: PHYSICIAN ASSISTANT

## 2025-06-04 PROCEDURE — 3074F SYST BP LT 130 MM HG: CPT | Performed by: PHYSICIAN ASSISTANT

## 2025-06-04 PROCEDURE — 80048 BASIC METABOLIC PNL TOTAL CA: CPT | Performed by: PHYSICIAN ASSISTANT

## 2025-06-04 PROCEDURE — 90472 IMMUNIZATION ADMIN EACH ADD: CPT | Performed by: PHYSICIAN ASSISTANT

## 2025-06-04 PROCEDURE — 82728 ASSAY OF FERRITIN: CPT | Performed by: PHYSICIAN ASSISTANT

## 2025-06-04 NOTE — PROGRESS NOTES
{PROVIDER CHARTING PREFERENCE:291011}    Subjective   Ruth is a 55 year old, presenting for the following health issues:  Recheck Medication  {(!) Visit Details have not yet been documented.  Please enter Visit Details and then use this list to pull in documentation. (Optional):817397}  History of Present Illness       Reason for visit:  Flow up   She is taking medications regularly.        {MA/LPN/RN Pre-Provider Visit Orders- hCG/UA/Strep (Optional):530384}  {SUPERLIST (Optional):108137}  {additonal problems for provider to add (Optional):239469}    {ROS Picklists (Optional):589075}      Objective    LMP 08/11/2016   There is no height or weight on file to calculate BMI.  Physical Exam   {Exam List (Optional):019035}    {Diagnostic Test Results (Optional):772624}        Signed Electronically by: Joleen Macdonald PA-C  {Email feedback regarding this note to [email or web address]   :604740}

## 2025-06-04 NOTE — PROGRESS NOTES
"  Assessment & Plan     Screen for colon cancer  She is hesitant to agree to do.    - COLOGUARD([employer]); Future    Cervical cancer screening  Refuses as this time     Hypothyroidism, unspecified type  Follow up when labs are back.    - TSH WITH FREE T4 REFLEX; Future  - TSH WITH FREE T4 REFLEX    Visit for screening mammogram  Again pt hesitant to do     Vitamin D deficiency  Follow up as needed.  Likely will need calcium and vitamin d supplement   - Vitamin D Deficiency; Future  - Vitamin D Deficiency    Iron deficiency anemia, unspecified iron deficiency anemia type  If normal won't need to continue to screen as she is no longer menstruating   - Ferritin; Future  - Ferritin    Elevated blood sugar  Follow up as needed  - Basic metabolic panel  (Ca, Cl, CO2, Creat, Gluc, K, Na, BUN); Future  - Basic metabolic panel  (Ca, Cl, CO2, Creat, Gluc, K, Na, BUN)    CARDIOVASCULAR SCREENING; LDL GOAL LESS THAN 160  As above  - Lipid panel reflex to direct LDL Fasting; Future  - Lipid panel reflex to direct LDL Fasting    Follow up in 6 months   The longitudinal plan of care for the diagnosis(es)/condition(s) as documented were addressed during this visit. Due to the added complexity in care, I will continue to support Ruth in the subsequent management and with ongoing continuity of care.      BMI  Estimated body mass index is 26.7 kg/m  as calculated from the following:    Height as of this encounter: 1.643 m (5' 4.7\").    Weight as of this encounter: 72.1 kg (159 lb).   Weight management plan: not concerned           Subjective   Ruth is a 55 year old, presenting for the following health issues:  Recheck Medication      6/4/2025     8:06 AM   Additional Questions   Roomed by Sandie   Accompanied by self     History of Present Illness       Reason for visit:  Flow up   She is taking medications regularly.          Hypothyroidism Follow-up    Since last visit, patient describes the following symptoms: Weight stable, " "no hair loss, no skin changes, no constipation, no loose stools  How many servings of fruits and vegetables do you eat daily?  2  On average, how many sweetened beverages do you drink each day (Examples: soda, juice, sweet tea, etc.  Do NOT count diet or artificially sweetened beverages)?   1  How many days per week do you exercise enough to make your heart beat faster? 3 or less  How many minutes a day do you exercise enough to make your heart beat faster? 20 - 29  How many days per week do you miss taking your medication? 0  Stopped vitamin d a few months ago.  Noticing more joint pain now.      Doesn't drink much milk.  Was iron def when having babies                     Objective    /69   Pulse 60   Temp 97.9  F (36.6  C) (Temporal)   Resp 16   Ht 1.643 m (5' 4.7\")   Wt 72.1 kg (159 lb)   LMP 08/11/2016   SpO2 100%   BMI 26.70 kg/m    Body mass index is 26.7 kg/m .  Physical Exam  Constitutional:       General: She is not in acute distress.  Neck:      Thyroid: No thyromegaly or thyroid tenderness.   Cardiovascular:      Rate and Rhythm: Normal rate and regular rhythm.   Pulmonary:      Effort: Pulmonary effort is normal.      Breath sounds: Normal breath sounds.   Lymphadenopathy:      Cervical: No cervical adenopathy.   Neurological:      Mental Status: She is alert.   Psychiatric:         Mood and Affect: Mood normal.                    Signed Electronically by: Joleen Macdonald PA-C    "

## 2025-06-06 ENCOUNTER — RESULTS FOLLOW-UP (OUTPATIENT)
Dept: FAMILY MEDICINE | Facility: CLINIC | Age: 55
End: 2025-06-06

## 2025-06-06 DIAGNOSIS — E55.9 VITAMIN D DEFICIENCY: Primary | ICD-10-CM

## 2025-08-27 DIAGNOSIS — E03.9 HYPOTHYROIDISM, UNSPECIFIED TYPE: ICD-10-CM

## 2025-08-27 RX ORDER — LEVOTHYROXINE SODIUM 50 UG/1
50 TABLET ORAL DAILY
Qty: 90 TABLET | Refills: 2 | Status: SHIPPED | OUTPATIENT
Start: 2025-08-27